# Patient Record
Sex: FEMALE | Race: WHITE | NOT HISPANIC OR LATINO | Employment: STUDENT | URBAN - METROPOLITAN AREA
[De-identification: names, ages, dates, MRNs, and addresses within clinical notes are randomized per-mention and may not be internally consistent; named-entity substitution may affect disease eponyms.]

---

## 2017-05-09 RX ORDER — DEXTROAMPHETAMINE SACCHARATE, AMPHETAMINE ASPARTATE MONOHYDRATE, DEXTROAMPHETAMINE SULFATE AND AMPHETAMINE SULFATE 7.5; 7.5; 7.5; 7.5 MG/1; MG/1; MG/1; MG/1
30 CAPSULE, EXTENDED RELEASE ORAL EVERY MORNING
COMMUNITY
End: 2017-06-23 | Stop reason: SDUPTHER

## 2017-06-23 ENCOUNTER — OFFICE VISIT (OUTPATIENT)
Dept: INTERNAL MEDICINE | Facility: CLINIC | Age: 27
End: 2017-06-23
Payer: COMMERCIAL

## 2017-06-23 ENCOUNTER — TELEPHONE (OUTPATIENT)
Dept: INTERNAL MEDICINE | Facility: CLINIC | Age: 27
End: 2017-06-23

## 2017-06-23 VITALS
RESPIRATION RATE: 16 BRPM | SYSTOLIC BLOOD PRESSURE: 92 MMHG | BODY MASS INDEX: 27.46 KG/M2 | HEART RATE: 77 BPM | WEIGHT: 170.88 LBS | DIASTOLIC BLOOD PRESSURE: 72 MMHG | HEIGHT: 66 IN | TEMPERATURE: 99 F | OXYGEN SATURATION: 98 %

## 2017-06-23 DIAGNOSIS — Z12.4 CERVICAL CANCER SCREENING: ICD-10-CM

## 2017-06-23 DIAGNOSIS — Z23 NEED FOR DIPHTHERIA-TETANUS-PERTUSSIS (TDAP) VACCINE: ICD-10-CM

## 2017-06-23 DIAGNOSIS — F90.0 ATTENTION DEFICIT HYPERACTIVITY DISORDER (ADHD), PREDOMINANTLY INATTENTIVE TYPE: Primary | ICD-10-CM

## 2017-06-23 DIAGNOSIS — Z79.899 ENCOUNTER FOR LONG-TERM (CURRENT) USE OF OTHER MEDICATIONS: ICD-10-CM

## 2017-06-23 PROCEDURE — 99213 OFFICE O/P EST LOW 20 MIN: CPT | Mod: S$GLB,,, | Performed by: FAMILY MEDICINE

## 2017-06-23 PROCEDURE — 99999 PR PBB SHADOW E&M-EST. PATIENT-LVL IV: CPT | Mod: PBBFAC,,, | Performed by: FAMILY MEDICINE

## 2017-06-23 RX ORDER — DEXTROAMPHETAMINE SACCHARATE, AMPHETAMINE ASPARTATE MONOHYDRATE, DEXTROAMPHETAMINE SULFATE AND AMPHETAMINE SULFATE 7.5; 7.5; 7.5; 7.5 MG/1; MG/1; MG/1; MG/1
30 CAPSULE, EXTENDED RELEASE ORAL EVERY MORNING
Qty: 30 CAPSULE | Refills: 0 | Status: SHIPPED | OUTPATIENT
Start: 2017-06-23 | End: 2017-09-15 | Stop reason: SDUPTHER

## 2017-06-23 RX ORDER — DEXTROAMPHETAMINE SACCHARATE, AMPHETAMINE ASPARTATE MONOHYDRATE, DEXTROAMPHETAMINE SULFATE AND AMPHETAMINE SULFATE 7.5; 7.5; 7.5; 7.5 MG/1; MG/1; MG/1; MG/1
30 CAPSULE, EXTENDED RELEASE ORAL EVERY MORNING
Qty: 30 CAPSULE | Refills: 0 | Status: SHIPPED | OUTPATIENT
Start: 2017-07-23 | End: 2017-09-15 | Stop reason: SDUPTHER

## 2017-06-23 RX ORDER — DEXTROAMPHETAMINE SACCHARATE, AMPHETAMINE ASPARTATE MONOHYDRATE, DEXTROAMPHETAMINE SULFATE AND AMPHETAMINE SULFATE 7.5; 7.5; 7.5; 7.5 MG/1; MG/1; MG/1; MG/1
30 CAPSULE, EXTENDED RELEASE ORAL EVERY MORNING
Qty: 30 CAPSULE | Refills: 0 | Status: SHIPPED | OUTPATIENT
Start: 2017-08-23 | End: 2017-09-15 | Stop reason: SDUPTHER

## 2017-06-23 NOTE — PROGRESS NOTES
NOTE: Documentation entered by me for this encounter was done in part using speech-recognition technology. Although I have made an effort to ensure accuracy, malapropisms may exist and should be interpreted in context.  -DEVIN Estrada MD    PATIENT ID: Alfie Carroll is a 27 y.o. female.    CHIEF COMPLAINT  ADHD; Medication Refill; and Follow-up      HISTORY OF PRESENT ILLNESS  NOTE: The following condition(s) were addressed during this encounter. The listed order is one of convenience and does not necessarily reflect the chronology of the appointment, nor the relative importance of a condition. It is possible that additional description or status details about condition(s) may be found elsewhere in the documentation for today's encounter.    Problem List Items Addressed This Visit     Attention deficit hyperactivity disorder (ADHD), predominantly inattentive type - Primary    Current Assessment & Plan     Based on the report of the patient and information available to me at present, this condition appears to be WELL CONTROLLED, and this condition appears to be STABLE. She describes the QUALITY of her symptoms as primarily those of the inattention category. She reports her perception of the SEVERITY of her inattention symptoms as MODERATELY when she doesn't take her medication. ONSET of her symptoms was in childhood, consistent with ADD/ADHD. She clearly identifies that her stimulant medication helps mitigate her symptoms (ALLEVIATING FACTOR) and she observes improved performance in relevant contexts (i.e. at work) when taking her medication. She reports no mood instability or other potential medication side-effects. She wants to continue her current treatment plan. She has demonstrated no behaviors to suggest inappropriate use of her medication.           Relevant Medications    dextroamphetamine-amphetamine (ADDERALL XR) 30 MG 24 hr capsule    dextroamphetamine-amphetamine (ADDERALL XR) 30 MG 24 hr capsule  (Start on 7/23/2017)    dextroamphetamine-amphetamine (ADDERALL XR) 30 MG 24 hr capsule (Start on 8/23/2017)    Encounter for long-term (current) use of other medications    Current Assessment & Plan     She appears to be tolerating her current medications well and reports preceiving no adverse side-effects.         Relevant Medications    dextroamphetamine-amphetamine (ADDERALL XR) 30 MG 24 hr capsule    dextroamphetamine-amphetamine (ADDERALL XR) 30 MG 24 hr capsule (Start on 7/23/2017)    dextroamphetamine-amphetamine (ADDERALL XR) 30 MG 24 hr capsule (Start on 8/23/2017)    Need for diphtheria-tetanus-pertussis (Tdap) vaccine    Relevant Medications    DIPH,PERTUSS,ACEL,,TET VAC,PF, ADULT (ADACEL) 2 Lf-(2.5-5-3-5 mcg)-5Lf/0.5 mL Syrg    Cervical cancer screening    Relevant Orders    Ambulatory referral to Gynecology      Other Visit Diagnoses    None.       REVIEW OF SYSTEMS  PSYCHIATRIC: No suicidal ideations, mima or hypomania reported.  NEUROLOGIC: No seizures or disordered movement reported.  ENDOCRINE: No polyuria or polydipsia reported.     PHYSICAL EXAM  CONSTITUTIONAL: Vital signs (BP, P, T, RR, et al) noted. No apparent distress. Does not appear acutely ill or septic. Appears adequately hydrated.  HEENT: External ENT grossly unremarkable. Hearing grossly intact. Oropharynx moist.  PULM: Breathing unlabored.  DERM: Skin warm and moist with normal turgor.  NEURO: There are no gross focal motor deficits or gross deficits of cranial nerves III-XII.  PSYCHIATRIC: Alert and oriented x 3. Mood is grossly neutral. Affect appropriate. Judgment and insight not grossly compromised.     HEALTH MAINTENANCE - DUE SOON OR OVERDUE  Health Maintenance Due   Topic Date Due    Pap Smear  02/23/2011    TETANUS VACCINE  08/10/2016        HEALTH MAINTENANCE - COMPLETED  Health Maintenance Topics with due status: Not Due       Topic Last Completion Date    Influenza Vaccine 10/06/2014        CURRENT MEDICATION  "LIST REVIEWED AND UPDATED  Current Outpatient Prescriptions   Medication Sig    dextroamphetamine-amphetamine (ADDERALL XR) 30 MG 24 hr capsule Take 1 capsule (30 mg total) by mouth every morning.    [START ON 7/23/2017] dextroamphetamine-amphetamine (ADDERALL XR) 30 MG 24 hr capsule Take 1 capsule (30 mg total) by mouth every morning.    [START ON 8/23/2017] dextroamphetamine-amphetamine (ADDERALL XR) 30 MG 24 hr capsule Take 1 capsule (30 mg total) by mouth every morning.    DIPH,PERTUSS,ACEL,,TET VAC,PF, ADULT (ADACEL) 2 Lf-(2.5-5-3-5 mcg)-5Lf/0.5 mL Syrg Inject 0.5 mLs into the muscle once. (Pharmacist administer according to protocol.)     No current facility-administered medications for this visit.        ALLERGY LIST REVIEWED AND UPDATED  Review of patient's allergies indicates:  No Known Allergies    MEDICAL HISTORY REVIEWED AND UPDATED  She has a past medical history of Attention deficit hyperactivity disorder (ADHD), predominantly inattentive type.    SURGICAL HISTORY REVIEWED AND UPDATED  She has a past surgical history that includes Vaginal delivery.    SOCIAL HISTORY REVIEWED AND UPDATED  She reports that she has never smoked. She has never used smokeless tobacco. She reports that she does not drink alcohol or use drugs.    ASSESSMENT and PLAN  NOTE: Unless specified otherwise, the PLAN for a listed ASSESSMENT is to continue present treatment as prescribed. The planned follow-up and additional "Patient Instructions" may also be found in the After Visit Summary printed and provided to the patient.    Attention deficit hyperactivity disorder (ADHD), predominantly inattentive type  -     dextroamphetamine-amphetamine (ADDERALL XR) 30 MG 24 hr capsule; Take 1 capsule (30 mg total) by mouth every morning.  Dispense: 30 capsule; Refill: 0  -     dextroamphetamine-amphetamine (ADDERALL XR) 30 MG 24 hr capsule; Take 1 capsule (30 mg total) by mouth every morning.  Dispense: 30 capsule; Refill: 0  -     " dextroamphetamine-amphetamine (ADDERALL XR) 30 MG 24 hr capsule; Take 1 capsule (30 mg total) by mouth every morning.  Dispense: 30 capsule; Refill: 0    Encounter for long-term (current) use of other medications  -     dextroamphetamine-amphetamine (ADDERALL XR) 30 MG 24 hr capsule; Take 1 capsule (30 mg total) by mouth every morning.  Dispense: 30 capsule; Refill: 0  -     dextroamphetamine-amphetamine (ADDERALL XR) 30 MG 24 hr capsule; Take 1 capsule (30 mg total) by mouth every morning.  Dispense: 30 capsule; Refill: 0  -     dextroamphetamine-amphetamine (ADDERALL XR) 30 MG 24 hr capsule; Take 1 capsule (30 mg total) by mouth every morning.  Dispense: 30 capsule; Refill: 0    Need for diphtheria-tetanus-pertussis (Tdap) vaccine  -     DIPH,PERTUSS,ACEL,,TET VAC,PF, ADULT (ADACEL) 2 Lf-(2.5-5-3-5 mcg)-5Lf/0.5 mL Syrg; Inject 0.5 mLs into the muscle once. (Pharmacist administer according to protocol.)  Dispense: 0.5 mL; Refill: 0    Cervical cancer screening  -     Ambulatory referral to Gynecology        Medication List with Changes/Refills   New Medications    DEXTROAMPHETAMINE-AMPHETAMINE (ADDERALL XR) 30 MG 24 HR CAPSULE    Take 1 capsule (30 mg total) by mouth every morning.    DEXTROAMPHETAMINE-AMPHETAMINE (ADDERALL XR) 30 MG 24 HR CAPSULE    Take 1 capsule (30 mg total) by mouth every morning.    DIPH,PERTUSS,ACEL,,TET VAC,PF, ADULT (ADACEL) 2 LF-(2.5-5-3-5 MCG)-5LF/0.5 ML SYRG    Inject 0.5 mLs into the muscle once. (Pharmacist administer according to protocol.)   Changed and/or Refilled Medications    Modified Medication Previous Medication    DEXTROAMPHETAMINE-AMPHETAMINE (ADDERALL XR) 30 MG 24 HR CAPSULE dextroamphetamine-amphetamine (ADDERALL XR) 30 MG 24 hr capsule       Take 1 capsule (30 mg total) by mouth every morning.    Take 30 mg by mouth every morning.       Return in about 3 months (around 9/23/2017) for re-evaluate chronic conditions.

## 2017-06-23 NOTE — ASSESSMENT & PLAN NOTE
Based on the report of the patient and information available to me at present, this condition appears to be WELL CONTROLLED, and this condition appears to be STABLE. She describes the QUALITY of her symptoms as primarily those of the inattention category. She reports her perception of the SEVERITY of her inattention symptoms as MODERATELY when she doesn't take her medication. ONSET of her symptoms was in childhood, consistent with ADD/ADHD. She clearly identifies that her stimulant medication helps mitigate her symptoms (ALLEVIATING FACTOR) and she observes improved performance in relevant contexts (i.e. at work) when taking her medication. She reports no mood instability or other potential medication side-effects. She wants to continue her current treatment plan. She has demonstrated no behaviors to suggest inappropriate use of her medication.

## 2017-06-23 NOTE — TELEPHONE ENCOUNTER
Faxed OB/GYN referral to pt current OB Dr. Jos Mae @ [facility], fax transmission confirmed [phone].

## 2017-09-15 ENCOUNTER — OFFICE VISIT (OUTPATIENT)
Dept: INTERNAL MEDICINE | Facility: CLINIC | Age: 27
End: 2017-09-15
Payer: COMMERCIAL

## 2017-09-15 VITALS
WEIGHT: 168.88 LBS | OXYGEN SATURATION: 98 % | TEMPERATURE: 97 F | SYSTOLIC BLOOD PRESSURE: 98 MMHG | HEART RATE: 77 BPM | HEIGHT: 66 IN | DIASTOLIC BLOOD PRESSURE: 62 MMHG | BODY MASS INDEX: 27.14 KG/M2

## 2017-09-15 DIAGNOSIS — Z23 NEED FOR DIPHTHERIA-TETANUS-PERTUSSIS (TDAP) VACCINE: ICD-10-CM

## 2017-09-15 DIAGNOSIS — Z79.899 ENCOUNTER FOR LONG-TERM (CURRENT) USE OF OTHER MEDICATIONS: ICD-10-CM

## 2017-09-15 DIAGNOSIS — F90.0 ATTENTION DEFICIT HYPERACTIVITY DISORDER (ADHD), PREDOMINANTLY INATTENTIVE TYPE: Primary | ICD-10-CM

## 2017-09-15 PROCEDURE — 99213 OFFICE O/P EST LOW 20 MIN: CPT | Mod: S$GLB,,, | Performed by: FAMILY MEDICINE

## 2017-09-15 PROCEDURE — 99999 PR PBB SHADOW E&M-EST. PATIENT-LVL III: CPT | Mod: PBBFAC,,, | Performed by: FAMILY MEDICINE

## 2017-09-15 PROCEDURE — 90471 IMMUNIZATION ADMIN: CPT | Mod: S$GLB,,, | Performed by: FAMILY MEDICINE

## 2017-09-15 PROCEDURE — 90715 TDAP VACCINE 7 YRS/> IM: CPT | Mod: S$GLB,,, | Performed by: FAMILY MEDICINE

## 2017-09-15 RX ORDER — DEXTROAMPHETAMINE SACCHARATE, AMPHETAMINE ASPARTATE MONOHYDRATE, DEXTROAMPHETAMINE SULFATE AND AMPHETAMINE SULFATE 7.5; 7.5; 7.5; 7.5 MG/1; MG/1; MG/1; MG/1
30 CAPSULE, EXTENDED RELEASE ORAL EVERY MORNING
Qty: 30 CAPSULE | Refills: 0 | Status: SHIPPED | OUTPATIENT
Start: 2017-09-15 | End: 2017-12-19 | Stop reason: SDUPTHER

## 2017-09-15 RX ORDER — DEXTROAMPHETAMINE SACCHARATE, AMPHETAMINE ASPARTATE MONOHYDRATE, DEXTROAMPHETAMINE SULFATE AND AMPHETAMINE SULFATE 7.5; 7.5; 7.5; 7.5 MG/1; MG/1; MG/1; MG/1
30 CAPSULE, EXTENDED RELEASE ORAL EVERY MORNING
Qty: 30 CAPSULE | Refills: 0 | Status: SHIPPED | OUTPATIENT
Start: 2017-11-15 | End: 2017-12-19 | Stop reason: SDUPTHER

## 2017-09-15 RX ORDER — DEXTROAMPHETAMINE SACCHARATE, AMPHETAMINE ASPARTATE MONOHYDRATE, DEXTROAMPHETAMINE SULFATE AND AMPHETAMINE SULFATE 7.5; 7.5; 7.5; 7.5 MG/1; MG/1; MG/1; MG/1
30 CAPSULE, EXTENDED RELEASE ORAL EVERY MORNING
Qty: 30 CAPSULE | Refills: 0 | Status: SHIPPED | OUTPATIENT
Start: 2017-10-15 | End: 2017-12-19 | Stop reason: SDUPTHER

## 2017-09-15 NOTE — PROGRESS NOTES
HEALTH MAINTENANCE REVIEW  Health Maintenance   Topic Date Due    Pap Smear  02/23/2011    Influenza Vaccine  08/01/2017    TETANUS VACCINE  09/15/2027    Lipid Panel  Completed        HEALTH MAINTENANCE INTERVENTIONS - DUE OR DUE SOON  Health Maintenance Due   Topic Date Due    Pap Smear  02/23/2011    Influenza Vaccine  08/01/2017       FUTURE APPOINTMENTS  No future appointments.    CHIEF COMPLAINT  ADHD      HISTORY OF PRESENT ILLNESS  PROBLEM/CONDITION: Attention deficit disorder appears fairly well-controlled on current treatment. No mood instability reported. No behaviors to suggest inappropriate use of medication.     PROBLEM/CONDITION: She has not had tetanus booster in over 10 years.     PROBLEM/CONDITION: She is due for Pap smear but does not want to do this today. She said she would schedule an appointment soon for Pap smear.    No other complaints or concerns reported.    Problem List Items Addressed This Visit     Attention deficit hyperactivity disorder (ADHD), predominantly inattentive type - Primary    Relevant Medications    dextroamphetamine-amphetamine (ADDERALL XR) 30 MG 24 hr capsule    dextroamphetamine-amphetamine (ADDERALL XR) 30 MG 24 hr capsule    dextroamphetamine-amphetamine (ADDERALL XR) 30 MG 24 hr capsule (Start on 11/15/2017)    Encounter for long-term (current) use of other medications    Relevant Medications    dextroamphetamine-amphetamine (ADDERALL XR) 30 MG 24 hr capsule    dextroamphetamine-amphetamine (ADDERALL XR) 30 MG 24 hr capsule    dextroamphetamine-amphetamine (ADDERALL XR) 30 MG 24 hr capsule (Start on 11/15/2017)    Need for diphtheria-tetanus-pertussis (Tdap) vaccine    Relevant Orders    (In Office Administered) Tdap Vaccine (Completed)          REVIEW OF SYSTEMS  PSYCHIATRIC: No suicidal ideations, mima or hypomania reported.  NEUROLOGIC: No seizures or disordered movement reported.  ENDOCRINE: No polyuria or polydipsia reported.     PHYSICAL  "EXAM  Vitals:    09/15/17 1400   BP: 98/62   BP Location: Right arm   Patient Position: Sitting   BP Method: Medium (Manual)   Pulse: 77   Temp: 97 °F (36.1 °C)   TempSrc: Tympanic   SpO2: 98%   Weight: 76.6 kg (168 lb 14 oz)   Height: 5' 5.5" (1.664 m)   CONSTITUTIONAL: Vital signs noted. No apparent distress. Does not appear acutely ill or septic. Appears adequately hydrated.  PULM: Lungs clear. Breathing unlabored.  CV: Heart regular.  GI: Abdomen soft and nontender.  DERM: Warm and moist.  PSYCHIATRIC: Alert and oriented x 3. Mood is grossly neutral. Affect appropriate. Judgment and insight not grossly compromised.     PAST MEDICAL HISTORY, FAMILY HISTORY, SOCIAL HISTORY, CURRENT MEDICATION LIST, and ALLERGY LIST reviewed by me (DEVIN Estrada MD) and are updated consistent with the patient's report.    ASSESSMENT and PLAN  Attention deficit hyperactivity disorder (ADHD), predominantly inattentive type  -     dextroamphetamine-amphetamine (ADDERALL XR) 30 MG 24 hr capsule; Take 1 capsule (30 mg total) by mouth every morning.  Dispense: 30 capsule; Refill: 0  -     dextroamphetamine-amphetamine (ADDERALL XR) 30 MG 24 hr capsule; Take 1 capsule (30 mg total) by mouth every morning.  Dispense: 30 capsule; Refill: 0  -     dextroamphetamine-amphetamine (ADDERALL XR) 30 MG 24 hr capsule; Take 1 capsule (30 mg total) by mouth every morning.  Dispense: 30 capsule; Refill: 0    Encounter for long-term (current) use of other medications  -     dextroamphetamine-amphetamine (ADDERALL XR) 30 MG 24 hr capsule; Take 1 capsule (30 mg total) by mouth every morning.  Dispense: 30 capsule; Refill: 0  -     dextroamphetamine-amphetamine (ADDERALL XR) 30 MG 24 hr capsule; Take 1 capsule (30 mg total) by mouth every morning.  Dispense: 30 capsule; Refill: 0  -     dextroamphetamine-amphetamine (ADDERALL XR) 30 MG 24 hr capsule; Take 1 capsule (30 mg total) by mouth every morning.  Dispense: 30 capsule; Refill: 0    Need " "for diphtheria-tetanus-pertussis (Tdap) vaccine  -     (In Office Administered) Tdap Vaccine        Medication List with Changes/Refills   Changed and/or Refilled Medications    Modified Medication Previous Medication    DEXTROAMPHETAMINE-AMPHETAMINE (ADDERALL XR) 30 MG 24 HR CAPSULE dextroamphetamine-amphetamine (ADDERALL XR) 30 MG 24 hr capsule       Take 1 capsule (30 mg total) by mouth every morning.    Take 1 capsule (30 mg total) by mouth every morning.    DEXTROAMPHETAMINE-AMPHETAMINE (ADDERALL XR) 30 MG 24 HR CAPSULE dextroamphetamine-amphetamine (ADDERALL XR) 30 MG 24 hr capsule       Take 1 capsule (30 mg total) by mouth every morning.    Take 1 capsule (30 mg total) by mouth every morning.    DEXTROAMPHETAMINE-AMPHETAMINE (ADDERALL XR) 30 MG 24 HR CAPSULE dextroamphetamine-amphetamine (ADDERALL XR) 30 MG 24 hr capsule       Take 1 capsule (30 mg total) by mouth every morning.    Take 1 capsule (30 mg total) by mouth every morning.       Return in about 3 months (around 12/19/2017) for re-evaluate problem(s) discussed today.    ABOUT THIS DOCUMENTATION:  · The order of the conditions listed in the HPI is one of convenience and does not necessarily reflect the chronology of the appointment, nor the relative importance of a condition. It is possible that additional description or status details about condition(s) may be found elsewhere in the documentation for today's encounter.  · Documentation entered by me for this encounter was done in part using speech-recognition technology. Although I have made an effort to ensure accuracy, "sound like" errors may exist and should be interpreted in context.                        -DEVIN Estrada MD    There are no Patient Instructions on file for this visit.     Answers for HPI/ROS submitted by the patient on 9/15/2017   activity change: No  unexpected weight change: No  neck pain: No  hearing loss: No  rhinorrhea: No  trouble swallowing: No  eye discharge: " No  visual disturbance: No  chest tightness: No  wheezing: No  chest pain: No  palpitations: No  blood in stool: No  constipation: No  vomiting: No  diarrhea: No  polydipsia: No  polyuria: No  difficulty urinating: No  hematuria: No  menstrual problem: No  dysuria: No  joint swelling: No  arthralgias: No  headaches: No  weakness: No  confusion: No  dysphoric mood: No

## 2017-12-19 ENCOUNTER — OFFICE VISIT (OUTPATIENT)
Dept: INTERNAL MEDICINE | Facility: CLINIC | Age: 27
End: 2017-12-19
Payer: COMMERCIAL

## 2017-12-19 VITALS
WEIGHT: 171.06 LBS | OXYGEN SATURATION: 100 % | SYSTOLIC BLOOD PRESSURE: 112 MMHG | HEART RATE: 95 BPM | HEIGHT: 66 IN | DIASTOLIC BLOOD PRESSURE: 72 MMHG | BODY MASS INDEX: 27.49 KG/M2 | TEMPERATURE: 96 F

## 2017-12-19 DIAGNOSIS — F90.0 ATTENTION-DEFICIT HYPERACTIVITY DISORDER, PREDOMINANTLY INATTENTIVE TYPE: ICD-10-CM

## 2017-12-19 DIAGNOSIS — F90.0 ATTENTION DEFICIT HYPERACTIVITY DISORDER (ADHD), PREDOMINANTLY INATTENTIVE TYPE: Primary | ICD-10-CM

## 2017-12-19 DIAGNOSIS — Z23 NEED FOR INFLUENZA VACCINATION: ICD-10-CM

## 2017-12-19 PROCEDURE — 90471 IMMUNIZATION ADMIN: CPT | Mod: S$GLB,,, | Performed by: FAMILY MEDICINE

## 2017-12-19 PROCEDURE — 90686 IIV4 VACC NO PRSV 0.5 ML IM: CPT | Mod: S$GLB,,, | Performed by: FAMILY MEDICINE

## 2017-12-19 PROCEDURE — 99999 PR PBB SHADOW E&M-EST. PATIENT-LVL III: CPT | Mod: PBBFAC,,, | Performed by: FAMILY MEDICINE

## 2017-12-19 PROCEDURE — 99213 OFFICE O/P EST LOW 20 MIN: CPT | Mod: 25,S$GLB,, | Performed by: FAMILY MEDICINE

## 2017-12-19 RX ORDER — DEXTROAMPHETAMINE SACCHARATE, AMPHETAMINE ASPARTATE MONOHYDRATE, DEXTROAMPHETAMINE SULFATE AND AMPHETAMINE SULFATE 7.5; 7.5; 7.5; 7.5 MG/1; MG/1; MG/1; MG/1
30 CAPSULE, EXTENDED RELEASE ORAL EVERY MORNING
Qty: 30 CAPSULE | Refills: 0 | Status: SHIPPED | OUTPATIENT
Start: 2017-12-19 | End: 2018-03-15 | Stop reason: SDUPTHER

## 2017-12-19 RX ORDER — DEXTROAMPHETAMINE SACCHARATE, AMPHETAMINE ASPARTATE MONOHYDRATE, DEXTROAMPHETAMINE SULFATE AND AMPHETAMINE SULFATE 7.5; 7.5; 7.5; 7.5 MG/1; MG/1; MG/1; MG/1
30 CAPSULE, EXTENDED RELEASE ORAL EVERY MORNING
Qty: 30 CAPSULE | Refills: 0 | Status: SHIPPED | OUTPATIENT
Start: 2018-01-19 | End: 2018-03-15 | Stop reason: SDUPTHER

## 2017-12-19 RX ORDER — DEXTROAMPHETAMINE SACCHARATE, AMPHETAMINE ASPARTATE MONOHYDRATE, DEXTROAMPHETAMINE SULFATE AND AMPHETAMINE SULFATE 7.5; 7.5; 7.5; 7.5 MG/1; MG/1; MG/1; MG/1
30 CAPSULE, EXTENDED RELEASE ORAL EVERY MORNING
Qty: 30 CAPSULE | Refills: 0 | Status: SHIPPED | OUTPATIENT
Start: 2018-02-19 | End: 2018-03-15 | Stop reason: SDUPTHER

## 2017-12-19 NOTE — PROGRESS NOTES
HEALTH MAINTENANCE REVIEW  Health Maintenance   Topic Date Due    Pap Smear  02/23/2011    Influenza Vaccine  08/01/2017    TETANUS VACCINE  09/15/2027    Lipid Panel  Completed        HEALTH MAINTENANCE INTERVENTIONS - DUE OR DUE SOON  Health Maintenance Due   Topic Date Due    Pap Smear  02/23/2011    Influenza Vaccine  08/01/2017       FUTURE APPOINTMENTS  No future appointments.    CHIEF COMPLAINT  Medication Refill      HISTORY OF PRESENT ILLNESS  PROBLEM/CONDITION: She comes in for re-evaluation of ADD/ADHD treatment. She clearly identifies that her medication helps mitigate her symptoms, and She observes improved performance in relevant circumstances when taking her medication and impaired performance when she misses her medication. She has demonstrated no behaviors to suggest inappropriate use of her medication. [facility] Controlled Prescription Drug Monitoring database was queried and showed no activity to suggest abuse, diversion, or other inappropriate use of prescription medications. No other complaints or concerns reported.      Problem List Items Addressed This Visit     Attention deficit hyperactivity disorder (ADHD), predominantly inattentive type - Primary    Relevant Medications    dextroamphetamine-amphetamine (ADDERALL XR) 30 MG 24 hr capsule    dextroamphetamine-amphetamine (ADDERALL XR) 30 MG 24 hr capsule (Start on 1/19/2018)    dextroamphetamine-amphetamine (ADDERALL XR) 30 MG 24 hr capsule (Start on 2/19/2018)      Other Visit Diagnoses     Attention-deficit hyperactivity disorder, predominantly inattentive type        Relevant Medications    dextroamphetamine-amphetamine (ADDERALL XR) 30 MG 24 hr capsule    dextroamphetamine-amphetamine (ADDERALL XR) 30 MG 24 hr capsule (Start on 1/19/2018)    dextroamphetamine-amphetamine (ADDERALL XR) 30 MG 24 hr capsule (Start on 2/19/2018)    Need for influenza vaccination              REVIEW OF SYSTEMS  PSYCHIATRIC: No mood  "instability reported.  NEUROLOGIC: No seizures or tics reported.     PHYSICAL EXAM  Vitals:    12/19/17 1034   BP: 112/72   BP Location: Right arm   Patient Position: Sitting   BP Method: Medium (Manual)   Pulse: 95   Temp: 96.4 °F (35.8 °C)   TempSrc: Tympanic   SpO2: 100%   Weight: 77.6 kg (171 lb 1.2 oz)   Height: 5' 5.5" (1.664 m)     CONST: Vital signs noted. No apparent distress. Appears well.  PULM: Breathing unlabored.  CV: Heart regular.  DERM: Warm and moist. Normal turgor.  PSYCH: Alert and oriented x 3. Mood is grossly euthymic. Affect appropriate. Judgment and insight not grossly compromised.     PAST MEDICAL HISTORY, FAMILY HISTORY, SOCIAL HISTORY, CURRENT MEDICATION LIST, and ALLERGY LIST reviewed by me (DEVIN Estrada MD) and are updated consistent with the patient's report.    ASSESSMENT and PLAN  Attention deficit hyperactivity disorder (ADHD), predominantly inattentive type  -     dextroamphetamine-amphetamine (ADDERALL XR) 30 MG 24 hr capsule; Take 1 capsule (30 mg total) by mouth every morning.  Dispense: 30 capsule; Refill: 0  -     dextroamphetamine-amphetamine (ADDERALL XR) 30 MG 24 hr capsule; Take 1 capsule (30 mg total) by mouth every morning.  Dispense: 30 capsule; Refill: 0  -     dextroamphetamine-amphetamine (ADDERALL XR) 30 MG 24 hr capsule; Take 1 capsule (30 mg total) by mouth every morning.  Dispense: 30 capsule; Refill: 0    Attention-deficit hyperactivity disorder, predominantly inattentive type  -     dextroamphetamine-amphetamine (ADDERALL XR) 30 MG 24 hr capsule; Take 1 capsule (30 mg total) by mouth every morning.  Dispense: 30 capsule; Refill: 0  -     dextroamphetamine-amphetamine (ADDERALL XR) 30 MG 24 hr capsule; Take 1 capsule (30 mg total) by mouth every morning.  Dispense: 30 capsule; Refill: 0  -     dextroamphetamine-amphetamine (ADDERALL XR) 30 MG 24 hr capsule; Take 1 capsule (30 mg total) by mouth every morning.  Dispense: 30 capsule; Refill: 0    Need " "for influenza vaccination    Other orders  -     Influenza - Quadrivalent (3 years & older) (PF)        PRESCRIPTION MEDICATION MANAGEMENT  Medication List with Changes/Refills   Changed and/or Refilled Medications    Modified Medication Previous Medication    DEXTROAMPHETAMINE-AMPHETAMINE (ADDERALL XR) 30 MG 24 HR CAPSULE dextroamphetamine-amphetamine (ADDERALL XR) 30 MG 24 hr capsule       Take 1 capsule (30 mg total) by mouth every morning.    Take 1 capsule (30 mg total) by mouth every morning.    DEXTROAMPHETAMINE-AMPHETAMINE (ADDERALL XR) 30 MG 24 HR CAPSULE dextroamphetamine-amphetamine (ADDERALL XR) 30 MG 24 hr capsule       Take 1 capsule (30 mg total) by mouth every morning.    Take 1 capsule (30 mg total) by mouth every morning.    DEXTROAMPHETAMINE-AMPHETAMINE (ADDERALL XR) 30 MG 24 HR CAPSULE dextroamphetamine-amphetamine (ADDERALL XR) 30 MG 24 hr capsule       Take 1 capsule (30 mg total) by mouth every morning.    Take 1 capsule (30 mg total) by mouth every morning.       Return in about 3 months (around 3/19/2018) for re-evaluate problem(s) discussed today.    ABOUT THIS DOCUMENTATION:  · The order of the conditions listed in the HPI is one of convenience and does not necessarily reflect the chronology of the appointment, nor the relative importance of a condition. It is possible that additional description or status details about condition(s) may be found elsewhere in the EHR documentation for today's encounter.  · Documentation entered by me for this encounter was done in part using speech-recognition technology. Although I have made an effort to ensure accuracy, "sound like" errors may exist and should be interpreted in context.                        -DEVIN Estrada MD    Patient Instructions           Answers for HPI/ROS submitted by the patient on 12/19/2017   activity change: No  unexpected weight change: No  neck pain: No  hearing loss: No  rhinorrhea: No  trouble swallowing: No  eye " discharge: No  visual disturbance: No  chest tightness: No  wheezing: No  chest pain: No  palpitations: No  blood in stool: No  constipation: No  vomiting: No  diarrhea: No  polydipsia: No  polyuria: No  difficulty urinating: No  hematuria: No  menstrual problem: No  dysuria: No  joint swelling: No  arthralgias: No  headaches: No  weakness: No  confusion: No  dysphoric mood: No

## 2018-03-15 ENCOUNTER — OFFICE VISIT (OUTPATIENT)
Dept: INTERNAL MEDICINE | Facility: CLINIC | Age: 28
End: 2018-03-15
Payer: MEDICAID

## 2018-03-15 VITALS
HEIGHT: 66 IN | WEIGHT: 172.81 LBS | BODY MASS INDEX: 27.77 KG/M2 | DIASTOLIC BLOOD PRESSURE: 72 MMHG | OXYGEN SATURATION: 99 % | HEART RATE: 96 BPM | SYSTOLIC BLOOD PRESSURE: 104 MMHG | TEMPERATURE: 97 F

## 2018-03-15 DIAGNOSIS — F90.0 ATTENTION-DEFICIT HYPERACTIVITY DISORDER, PREDOMINANTLY INATTENTIVE TYPE: ICD-10-CM

## 2018-03-15 DIAGNOSIS — F90.0 ATTENTION DEFICIT HYPERACTIVITY DISORDER (ADHD), PREDOMINANTLY INATTENTIVE TYPE: Primary | ICD-10-CM

## 2018-03-15 PROBLEM — Z12.4 CERVICAL CANCER SCREENING: Status: RESOLVED | Noted: 2017-06-23 | Resolved: 2018-03-15

## 2018-03-15 PROBLEM — Z23 NEED FOR DIPHTHERIA-TETANUS-PERTUSSIS (TDAP) VACCINE: Status: RESOLVED | Noted: 2017-06-23 | Resolved: 2018-03-15

## 2018-03-15 PROCEDURE — 99213 OFFICE O/P EST LOW 20 MIN: CPT | Mod: PBBFAC,PO | Performed by: FAMILY MEDICINE

## 2018-03-15 PROCEDURE — 99213 OFFICE O/P EST LOW 20 MIN: CPT | Mod: S$PBB,,, | Performed by: FAMILY MEDICINE

## 2018-03-15 PROCEDURE — 99999 PR PBB SHADOW E&M-EST. PATIENT-LVL III: CPT | Mod: PBBFAC,,, | Performed by: FAMILY MEDICINE

## 2018-03-15 RX ORDER — DEXTROAMPHETAMINE SACCHARATE, AMPHETAMINE ASPARTATE MONOHYDRATE, DEXTROAMPHETAMINE SULFATE AND AMPHETAMINE SULFATE 7.5; 7.5; 7.5; 7.5 MG/1; MG/1; MG/1; MG/1
30 CAPSULE, EXTENDED RELEASE ORAL EVERY MORNING
Qty: 30 CAPSULE | Refills: 0 | Status: SHIPPED | OUTPATIENT
Start: 2018-04-15 | End: 2018-06-15 | Stop reason: SDUPTHER

## 2018-03-15 RX ORDER — DEXTROAMPHETAMINE SACCHARATE, AMPHETAMINE ASPARTATE MONOHYDRATE, DEXTROAMPHETAMINE SULFATE AND AMPHETAMINE SULFATE 7.5; 7.5; 7.5; 7.5 MG/1; MG/1; MG/1; MG/1
30 CAPSULE, EXTENDED RELEASE ORAL EVERY MORNING
Qty: 30 CAPSULE | Refills: 0 | Status: SHIPPED | OUTPATIENT
Start: 2018-05-15 | End: 2018-06-15 | Stop reason: SDUPTHER

## 2018-03-15 RX ORDER — DEXTROAMPHETAMINE SACCHARATE, AMPHETAMINE ASPARTATE MONOHYDRATE, DEXTROAMPHETAMINE SULFATE AND AMPHETAMINE SULFATE 7.5; 7.5; 7.5; 7.5 MG/1; MG/1; MG/1; MG/1
30 CAPSULE, EXTENDED RELEASE ORAL EVERY MORNING
Qty: 30 CAPSULE | Refills: 0 | Status: SHIPPED | OUTPATIENT
Start: 2018-03-15 | End: 2018-06-15 | Stop reason: SDUPTHER

## 2018-03-15 NOTE — PROGRESS NOTES
"FUTURE APPOINTMENTS  No future appointments.    CHIEF COMPLAINT  Medication Refill      HISTORY OF PRESENT ILLNESS  She reports that her current treatment is providing satisfactory relief of her attention deficit disorder symptoms and helping her compensate for her deficits in relevant contexts. She reports that she is tolerating her current treatment well. she reports no mood instability, tics, or disordered sleep, or other apparent adverse effects. She is demonstrating no behaviors to suggest inappropriate use of prescribed medications. [place] [employer]acy Controlled Prescription Drug Monitoring database was queried and showed no activity to suggest abuse, diversion, or other inappropriate use of prescription medications.     Problem List Items Addressed This Visit     Attention deficit hyperactivity disorder (ADHD), predominantly inattentive type - Primary    Relevant Medications    dextroamphetamine-amphetamine (ADDERALL XR) 30 MG 24 hr capsule    dextroamphetamine-amphetamine (ADDERALL XR) 30 MG 24 hr capsule (Start on 4/15/2018)    dextroamphetamine-amphetamine (ADDERALL XR) 30 MG 24 hr capsule (Start on 5/15/2018)      Other Visit Diagnoses     Attention-deficit hyperactivity disorder, predominantly inattentive type        Relevant Medications    dextroamphetamine-amphetamine (ADDERALL XR) 30 MG 24 hr capsule    dextroamphetamine-amphetamine (ADDERALL XR) 30 MG 24 hr capsule (Start on 4/15/2018)    dextroamphetamine-amphetamine (ADDERALL XR) 30 MG 24 hr capsule (Start on 5/15/2018)          REVIEW OF SYSTEMS  PSYCHIATRIC: No mood instability reported.  NEUROLOGIC: No seizures or tics reported.     PHYSICAL EXAM  Vitals:    03/15/18 0828   BP: 104/72   BP Location: Right arm   Patient Position: Sitting   BP Method: Medium (Manual)   Pulse: 96   Temp: 96.9 °F (36.1 °C)   TempSrc: Tympanic   SpO2: 99%   Weight: 78.4 kg (172 lb 13.5 oz)   Height: 5' 5.5" (1.664 m)     CONST: Vital signs noted. No apparent " distress. Appears well.  PULM: Breathing unlabored.  CV: Heart regular.  DERM: Warm and moist. Normal turgor.  PSYCH: Alert and oriented x 3. Mood is grossly euthymic. Affect appropriate. Judgment and insight not grossly compromised.     PAST MEDICAL HISTORY, FAMILY HISTORY, SOCIAL HISTORY, CURRENT MEDICATION LIST, and ALLERGY LIST reviewed by me (DEVIN Estrada MD) and are updated consistent with the patient's report.    ASSESSMENT and PLAN  Attention deficit hyperactivity disorder (ADHD), predominantly inattentive type  -     dextroamphetamine-amphetamine (ADDERALL XR) 30 MG 24 hr capsule; Take 1 capsule (30 mg total) by mouth every morning.  Dispense: 30 capsule; Refill: 0  -     dextroamphetamine-amphetamine (ADDERALL XR) 30 MG 24 hr capsule; Take 1 capsule (30 mg total) by mouth every morning.  Dispense: 30 capsule; Refill: 0  -     dextroamphetamine-amphetamine (ADDERALL XR) 30 MG 24 hr capsule; Take 1 capsule (30 mg total) by mouth every morning.  Dispense: 30 capsule; Refill: 0    Attention-deficit hyperactivity disorder, predominantly inattentive type  -     dextroamphetamine-amphetamine (ADDERALL XR) 30 MG 24 hr capsule; Take 1 capsule (30 mg total) by mouth every morning.  Dispense: 30 capsule; Refill: 0  -     dextroamphetamine-amphetamine (ADDERALL XR) 30 MG 24 hr capsule; Take 1 capsule (30 mg total) by mouth every morning.  Dispense: 30 capsule; Refill: 0  -     dextroamphetamine-amphetamine (ADDERALL XR) 30 MG 24 hr capsule; Take 1 capsule (30 mg total) by mouth every morning.  Dispense: 30 capsule; Refill: 0        PRESCRIPTION MEDICATION MANAGEMENT  Medication List with Changes/Refills   Changed and/or Refilled Medications    Modified Medication Previous Medication    DEXTROAMPHETAMINE-AMPHETAMINE (ADDERALL XR) 30 MG 24 HR CAPSULE dextroamphetamine-amphetamine (ADDERALL XR) 30 MG 24 hr capsule       Take 1 capsule (30 mg total) by mouth every morning.    Take 1 capsule (30 mg total) by mouth  "every morning.    DEXTROAMPHETAMINE-AMPHETAMINE (ADDERALL XR) 30 MG 24 HR CAPSULE dextroamphetamine-amphetamine (ADDERALL XR) 30 MG 24 hr capsule       Take 1 capsule (30 mg total) by mouth every morning.    Take 1 capsule (30 mg total) by mouth every morning.    DEXTROAMPHETAMINE-AMPHETAMINE (ADDERALL XR) 30 MG 24 HR CAPSULE dextroamphetamine-amphetamine (ADDERALL XR) 30 MG 24 hr capsule       Take 1 capsule (30 mg total) by mouth every morning.    Take 1 capsule (30 mg total) by mouth every morning.       Follow-up in about 3 months (around 6/15/2018).    ABOUT THIS DOCUMENTATION:  · The order of the conditions listed in the HPI is one of convenience and does not necessarily reflect the chronology of the appointment, nor the relative importance of a condition. It is possible that additional description or status details about condition(s) may be found elsewhere in the EHR documentation for today's encounter.  · Documentation entered by me for this encounter was done in part using speech-recognition technology. Although I have made an effort to ensure accuracy, "sound like" errors may exist and should be interpreted in context.                        -DEVIN Estrada MD    There are no Patient Instructions on file for this visit.    "

## 2018-06-15 ENCOUNTER — OFFICE VISIT (OUTPATIENT)
Dept: INTERNAL MEDICINE | Facility: CLINIC | Age: 28
End: 2018-06-15
Payer: MEDICAID

## 2018-06-15 VITALS
BODY MASS INDEX: 27.49 KG/M2 | OXYGEN SATURATION: 99 % | SYSTOLIC BLOOD PRESSURE: 108 MMHG | HEIGHT: 66 IN | TEMPERATURE: 97 F | HEART RATE: 109 BPM | WEIGHT: 171.06 LBS | DIASTOLIC BLOOD PRESSURE: 72 MMHG

## 2018-06-15 DIAGNOSIS — F90.0 ATTENTION DEFICIT HYPERACTIVITY DISORDER (ADHD), PREDOMINANTLY INATTENTIVE TYPE: Primary | ICD-10-CM

## 2018-06-15 PROCEDURE — 99213 OFFICE O/P EST LOW 20 MIN: CPT | Mod: PBBFAC,PO | Performed by: FAMILY MEDICINE

## 2018-06-15 PROCEDURE — 99213 OFFICE O/P EST LOW 20 MIN: CPT | Mod: S$PBB,,, | Performed by: FAMILY MEDICINE

## 2018-06-15 PROCEDURE — 99999 PR PBB SHADOW E&M-EST. PATIENT-LVL III: CPT | Mod: PBBFAC,,, | Performed by: FAMILY MEDICINE

## 2018-06-15 RX ORDER — DEXTROAMPHETAMINE SACCHARATE, AMPHETAMINE ASPARTATE MONOHYDRATE, DEXTROAMPHETAMINE SULFATE AND AMPHETAMINE SULFATE 7.5; 7.5; 7.5; 7.5 MG/1; MG/1; MG/1; MG/1
30 CAPSULE, EXTENDED RELEASE ORAL EVERY MORNING
Qty: 30 CAPSULE | Refills: 0 | Status: SHIPPED | OUTPATIENT
Start: 2018-06-15 | End: 2018-09-11 | Stop reason: SDUPTHER

## 2018-06-15 RX ORDER — DEXTROAMPHETAMINE SACCHARATE, AMPHETAMINE ASPARTATE MONOHYDRATE, DEXTROAMPHETAMINE SULFATE AND AMPHETAMINE SULFATE 7.5; 7.5; 7.5; 7.5 MG/1; MG/1; MG/1; MG/1
30 CAPSULE, EXTENDED RELEASE ORAL EVERY MORNING
Qty: 30 CAPSULE | Refills: 0 | Status: SHIPPED | OUTPATIENT
Start: 2018-06-15 | End: 2018-06-15 | Stop reason: SDUPTHER

## 2018-06-15 RX ORDER — DEXTROAMPHETAMINE SACCHARATE, AMPHETAMINE ASPARTATE MONOHYDRATE, DEXTROAMPHETAMINE SULFATE AND AMPHETAMINE SULFATE 7.5; 7.5; 7.5; 7.5 MG/1; MG/1; MG/1; MG/1
30 CAPSULE, EXTENDED RELEASE ORAL EVERY MORNING
Qty: 30 CAPSULE | Refills: 0 | Status: SHIPPED | OUTPATIENT
Start: 2018-07-15 | End: 2018-09-11 | Stop reason: SDUPTHER

## 2018-06-15 RX ORDER — DEXTROAMPHETAMINE SACCHARATE, AMPHETAMINE ASPARTATE MONOHYDRATE, DEXTROAMPHETAMINE SULFATE AND AMPHETAMINE SULFATE 7.5; 7.5; 7.5; 7.5 MG/1; MG/1; MG/1; MG/1
30 CAPSULE, EXTENDED RELEASE ORAL EVERY MORNING
Qty: 30 CAPSULE | Refills: 0 | Status: SHIPPED | OUTPATIENT
Start: 2018-08-15 | End: 2018-09-11 | Stop reason: SDUPTHER

## 2018-06-15 NOTE — PROGRESS NOTES
"NOTE: Her attention deficit disorder has been stable. I told her that if she continued to do well, she could send me a status update in 2-3 months and request refills, and she would return to see me three months thereafter, assuming her condition remained stable.    CHIEF COMPLAINT  Medication Refill      HISTORY OF PRESENT ILLNESS  She reports that her current treatment is providing satisfactory relief of her attention deficit disorder symptoms and helping her compensate for her deficits in the work she does. She reports that she is tolerating her current treatment well. she reports no mood instability, tics, or disordered sleep, or other apparent adverse effects. She is demonstrating no behaviors to suggest inappropriate use of prescribed medications. [place] Board of Pharmacy Controlled Prescription Drug Monitoring database was queried and showed no activity to suggest abuse, diversion, or other inappropriate use of prescription medications.     Problem List Items Addressed This Visit     None          PAST MEDICAL HISTORY, FAMILY HISTORY and SOCIAL HISTORY, CURRENT MEDICATION LIST, and ALLERGY LIST reviewed by me (DEVIN Estrada MD) and are updated consistent with the patient's report.    REVIEW OF SYSTEMS  PSYCHIATRIC: No mood instability reported.  NEUROLOGIC: No seizures or tics reported.     PHYSICAL EXAM  Vitals:    06/15/18 1323   BP: 108/72   BP Location: Right arm   Patient Position: Sitting   BP Method: Medium (Manual)   Pulse: 109   Temp: 97.2 °F (36.2 °C)   TempSrc: Tympanic   SpO2: 99%   Weight: 77.6 kg (171 lb 1.2 oz)   Height: 5' 5.5" (1.664 m)     CONST: Vital signs noted. No apparent distress. Appears well.  PULM: Lungs clear. Breathing unlabored.  CV: Heart regular.  DERM: Warm and moist. Normal turgor.  PSYCH: Alert and oriented x 3. Mood is grossly euthymic. Affect appropriate. Judgment and insight not grossly compromised.     ASSESSMENT and PLAN  There are no diagnoses linked to this " "encounter.    PRESCRIPTION MEDICATION MANAGEMENT  Except as noted below, CURRENT MEDICATIONS are to remain unchanged from that listed above.     There are no discontinued medications.    No Follow-up on file.    ABOUT THIS DOCUMENTATION:  · The order of the conditions listed in the HPI is one of convenience and does not necessarily reflect the chronology of the appointment, nor the relative importance of a condition. It is possible that additional description or status details about condition(s) may be found elsewhere in the EHR documentation for today's encounter.  · Documentation entered by me for this encounter was done in part using speech-recognition technology. Although I have made an effort to ensure accuracy, "sound like" errors may exist and should be interpreted in context.                        -DEVIN Estrada MD    There are no Patient Instructions on file for this visit.          "

## 2018-08-01 ENCOUNTER — PATIENT MESSAGE (OUTPATIENT)
Dept: INTERNAL MEDICINE | Facility: CLINIC | Age: 28
End: 2018-08-01

## 2018-08-04 ENCOUNTER — PATIENT MESSAGE (OUTPATIENT)
Dept: INTERNAL MEDICINE | Facility: CLINIC | Age: 28
End: 2018-08-04

## 2018-09-11 ENCOUNTER — OFFICE VISIT (OUTPATIENT)
Dept: INTERNAL MEDICINE | Facility: CLINIC | Age: 28
End: 2018-09-11
Payer: MEDICAID

## 2018-09-11 VITALS
HEIGHT: 66 IN | TEMPERATURE: 99 F | DIASTOLIC BLOOD PRESSURE: 78 MMHG | HEART RATE: 80 BPM | SYSTOLIC BLOOD PRESSURE: 116 MMHG | OXYGEN SATURATION: 100 % | WEIGHT: 171.75 LBS | BODY MASS INDEX: 27.6 KG/M2

## 2018-09-11 DIAGNOSIS — F90.0 ATTENTION DEFICIT HYPERACTIVITY DISORDER (ADHD), PREDOMINANTLY INATTENTIVE TYPE: ICD-10-CM

## 2018-09-11 PROCEDURE — 99213 OFFICE O/P EST LOW 20 MIN: CPT | Mod: S$PBB,,, | Performed by: FAMILY MEDICINE

## 2018-09-11 PROCEDURE — 99999 PR PBB SHADOW E&M-EST. PATIENT-LVL III: CPT | Mod: PBBFAC,,, | Performed by: FAMILY MEDICINE

## 2018-09-11 PROCEDURE — 99213 OFFICE O/P EST LOW 20 MIN: CPT | Mod: PBBFAC,PO | Performed by: FAMILY MEDICINE

## 2018-09-11 RX ORDER — DEXTROAMPHETAMINE SACCHARATE, AMPHETAMINE ASPARTATE MONOHYDRATE, DEXTROAMPHETAMINE SULFATE AND AMPHETAMINE SULFATE 7.5; 7.5; 7.5; 7.5 MG/1; MG/1; MG/1; MG/1
30 CAPSULE, EXTENDED RELEASE ORAL EVERY MORNING
Qty: 30 CAPSULE | Refills: 0 | Status: SHIPPED | OUTPATIENT
Start: 2018-09-11 | End: 2019-03-07

## 2018-09-11 RX ORDER — DEXTROAMPHETAMINE SACCHARATE, AMPHETAMINE ASPARTATE MONOHYDRATE, DEXTROAMPHETAMINE SULFATE AND AMPHETAMINE SULFATE 7.5; 7.5; 7.5; 7.5 MG/1; MG/1; MG/1; MG/1
30 CAPSULE, EXTENDED RELEASE ORAL EVERY MORNING
Qty: 30 CAPSULE | Refills: 0 | Status: SHIPPED | OUTPATIENT
Start: 2018-10-11 | End: 2019-03-07

## 2018-09-11 RX ORDER — DEXTROAMPHETAMINE SACCHARATE, AMPHETAMINE ASPARTATE MONOHYDRATE, DEXTROAMPHETAMINE SULFATE AND AMPHETAMINE SULFATE 7.5; 7.5; 7.5; 7.5 MG/1; MG/1; MG/1; MG/1
30 CAPSULE, EXTENDED RELEASE ORAL EVERY MORNING
Qty: 30 CAPSULE | Refills: 0 | Status: SHIPPED | OUTPATIENT
Start: 2018-11-11 | End: 2018-11-26 | Stop reason: SDUPTHER

## 2018-09-11 NOTE — PROGRESS NOTES
NOTE: Her attention deficit disorder has been stable. I told her that if she continued to do well, she could send me a status update in 2-3 months and request refills, and she would return to see me three months thereafter, assuming her condition remained stable.     CHIEF COMPLAINT  Medication Refill      HISTORY OF PRESENT ILLNESS  She reports that her current treatment is providing satisfactory relief of her attention deficit disorder symptoms and helping her compensate for her deficits. She reports that she is tolerating her current treatment well. She reports no mood instability, tics, or disordered sleep, or other apparent adverse effects. She is demonstrating no behaviors to suggest inappropriate use of prescribed medications. [place] Board [employer]acy Controlled Prescription Drug Monitoring database was queried and showed no activity to suggest abuse, diversion, or other inappropriate use of prescription medications.     Problem List Items Addressed This Visit        Psychiatric    Attention deficit hyperactivity disorder (ADHD), predominantly inattentive type    Relevant Medications    dextroamphetamine-amphetamine (ADDERALL XR) 30 MG 24 hr capsule    dextroamphetamine-amphetamine (ADDERALL XR) 30 MG 24 hr capsule (Start on 10/11/2018)    dextroamphetamine-amphetamine (ADDERALL XR) 30 MG 24 hr capsule (Start on 11/11/2018)          PAST MEDICAL HISTORY, FAMILY HISTORY and SOCIAL HISTORY, CURRENT MEDICATION LIST, and ALLERGY LIST reviewed by me (DEVIN Estrada MD) and are updated consistent with the patient's report.    REVIEW OF SYSTEMS  PSYCHIATRIC: No mood instability reported.  NEUROLOGIC: No seizures or tics reported.   Answers for HPI/ROS submitted by the patient on 9/11/2018    activity change: No   unexpected weight change: No   neck pain: No   hearing loss: No   rhinorrhea: No   trouble swallowing: No   eye discharge: No   visual disturbance: No   chest tightness: No   wheezing: No   chest  "pain: No   palpitations: No   blood in stool: No   constipation: No   vomiting: No   diarrhea: No   polydipsia: No   polyuria: No   difficulty urinating: No   hematuria: No   menstrual problem: No   dysuria: No   joint swelling: No   arthralgias: No   headaches: No   weakness: No   confusion: No   dysphoric mood: No    PHYSICAL EXAM  Vitals:    09/11/18 0853   BP: 116/78   BP Location: Right arm   Patient Position: Sitting   BP Method: Medium (Manual)   Pulse: 80   Temp: 99 °F (37.2 °C)   TempSrc: Tympanic   SpO2: 100%   Weight: 77.9 kg (171 lb 11.8 oz)   Height: 5' 5.5" (1.664 m)     CONST: Vital signs noted. No apparent distress. Appears well.  PULM: Breathing unlabored.  CV: Heart regular.  DERM: Warm and moist. Normal turgor.  PSYCH: Alert and oriented x 3. Mood is grossly euthymic. Affect appropriate. Judgment and insight not grossly compromised.     ASSESSMENT and PLAN  Attention deficit hyperactivity disorder (ADHD), predominantly inattentive type  -     dextroamphetamine-amphetamine (ADDERALL XR) 30 MG 24 hr capsule; Take 1 capsule (30 mg total) by mouth every morning.  Dispense: 30 capsule; Refill: 0  -     dextroamphetamine-amphetamine (ADDERALL XR) 30 MG 24 hr capsule; Take 1 capsule (30 mg total) by mouth every morning.  Dispense: 30 capsule; Refill: 0  -     dextroamphetamine-amphetamine (ADDERALL XR) 30 MG 24 hr capsule; Take 1 capsule (30 mg total) by mouth every morning.  Dispense: 30 capsule; Refill: 0        PRESCRIPTION MEDICATION MANAGEMENT  Except as noted below, CURRENT MEDICATIONS are to remain unchanged from that listed above.  Medications Ordered This Encounter   Medications    dextroamphetamine-amphetamine (ADDERALL XR) 30 MG 24 hr capsule     Sig: Take 1 capsule (30 mg total) by mouth every morning.     Dispense:  30 capsule     Refill:  0           Order Specific Question:   This medication typically requires a prior authorization. For prior auth services, patient financial assistance, " "patient education and medication management send to an [facility] retail pharmacy.     Answer:   No, I will select a different retail pharmacy    dextroamphetamine-amphetamine (ADDERALL XR) 30 MG 24 hr capsule     Sig: Take 1 capsule (30 mg total) by mouth every morning.     Dispense:  30 capsule     Refill:  0           Order Specific Question:   This medication typically requires a prior authorization. For prior auth services, patient financial assistance, patient education and medication management send to an [facility] retail pharmacy.     Answer:   No, I will select a different retail pharmacy    dextroamphetamine-amphetamine (ADDERALL XR) 30 MG 24 hr capsule     Sig: Take 1 capsule (30 mg total) by mouth every morning.     Dispense:  30 capsule     Refill:  0           Order Specific Question:   This medication typically requires a prior authorization. For prior auth services, patient financial assistance, patient education and medication management send to an [facility] retail pharmacy.     Answer:   No, I will select a different retail pharmacy     Medications Discontinued During This Encounter   Medication Reason    dextroamphetamine-amphetamine (ADDERALL XR) 30 MG 24 hr capsule Reorder    dextroamphetamine-amphetamine (ADDERALL XR) 30 MG 24 hr capsule Reorder    dextroamphetamine-amphetamine (ADDERALL XR) 30 MG 24 hr capsule Reorder       Follow-up in about 6 months (around 3/11/2019).    There are no Patient Instructions on file for this visit.    ABOUT THIS DOCUMENTATION:  · The order of the conditions listed in the HPI is one of convenience and does not necessarily reflect the chronology of the appointment, nor the relative importance of a condition.  · Documentation entered by me for this encounter was done in part using speech-recognition technology. Although I have made an effort to ensure accuracy, "sound like" errors may exist and should be interpreted in context.                        KODY Guaman " MD Sean

## 2018-11-24 ENCOUNTER — PATIENT MESSAGE (OUTPATIENT)
Dept: INTERNAL MEDICINE | Facility: CLINIC | Age: 28
End: 2018-11-24

## 2018-11-26 DIAGNOSIS — F90.0 ATTENTION DEFICIT HYPERACTIVITY DISORDER (ADHD), PREDOMINANTLY INATTENTIVE TYPE: ICD-10-CM

## 2018-11-26 NOTE — TELEPHONE ENCOUNTER
Patient is requesting refill on adderall. Pharmacy verified ([facility]).//ddw

## 2018-11-27 RX ORDER — DEXTROAMPHETAMINE SACCHARATE, AMPHETAMINE ASPARTATE MONOHYDRATE, DEXTROAMPHETAMINE SULFATE AND AMPHETAMINE SULFATE 7.5; 7.5; 7.5; 7.5 MG/1; MG/1; MG/1; MG/1
30 CAPSULE, EXTENDED RELEASE ORAL EVERY MORNING
Qty: 30 CAPSULE | Refills: 0 | Status: SHIPPED | OUTPATIENT
Start: 2018-12-27 | End: 2019-03-07

## 2018-11-27 RX ORDER — DEXTROAMPHETAMINE SACCHARATE, AMPHETAMINE ASPARTATE MONOHYDRATE, DEXTROAMPHETAMINE SULFATE AND AMPHETAMINE SULFATE 7.5; 7.5; 7.5; 7.5 MG/1; MG/1; MG/1; MG/1
30 CAPSULE, EXTENDED RELEASE ORAL EVERY MORNING
Qty: 30 CAPSULE | Refills: 0 | Status: SHIPPED | OUTPATIENT
Start: 2018-11-27 | End: 2019-03-07

## 2018-11-27 NOTE — TELEPHONE ENCOUNTER
At her last appointment she was doing well from the standpoint of her ADD, and she is requesting continuation of treatment. [place] Board of [employer]acy Controlled Prescription Drug Monitoring database was queried and showed no activity to suggest abuse, diversion, or other inappropriate use of prescription medications.       Alfie Abad.    I received your request for refill of your ADD medicine.    As we discussed at your previous appointment, I refilled your ADD medicine assuming that: (1) You feel the medicine is working well for you; (2) You are tolerating the medicine without significant side-effects; and (3) You want to continue taking the medicine.    If this is not the case, please schedule an appointment with me ASAP to discuss other treatment options.    Unless you specified a pharmacy in your message, I sent your refills to the same pharmacy you used before. You will need to  your medications from that pharmacy, because this type of prescription cannot be transferred from pharmacy to pharmacy.    I must see you in the office for re-evaluation prior to giving you any more refills, so be sure to schedule an appointment with me in 3 months (or before you need any additional refills).    Thanks for letting me care for you.    Sincerely,     DEVIN Estrada MD  --------------------------------------------------------------------------------   Medications Ordered This Encounter   Medications    dextroamphetamine-amphetamine (ADDERALL XR) 30 MG 24 hr capsule    dextroamphetamine-amphetamine (ADDERALL XR) 30 MG 24 hr capsule    dextroamphetamine-amphetamine (ADDERALL XR) 30 MG 24 hr capsule

## 2019-01-08 ENCOUNTER — PATIENT MESSAGE (OUTPATIENT)
Dept: INTERNAL MEDICINE | Facility: CLINIC | Age: 29
End: 2019-01-08

## 2019-01-14 NOTE — TELEPHONE ENCOUNTER
See [employer] message. PA request initiated to pt's insurance for Adderall XR 30mg rx, submitted online via [email or web address] Request [id number].

## 2019-01-16 ENCOUNTER — TELEPHONE (OUTPATIENT)
Dept: INTERNAL MEDICINE | Facility: CLINIC | Age: 29
End: 2019-01-16

## 2019-01-16 NOTE — TELEPHONE ENCOUNTER
"Fax received from pt's insurance stating PA requested on Adderall XR 30mg not needed, stating "medication does not require prior authorization," PA Ref # [id number]. Faxed letter to pt's pharmacy instructing them to fill rx ASAP and notify pt when rx ready, fax transmission confirmed F#[phone].  "

## 2019-01-23 ENCOUNTER — TELEPHONE (OUTPATIENT)
Dept: INTERNAL MEDICINE | Facility: CLINIC | Age: 29
End: 2019-01-23

## 2019-03-07 ENCOUNTER — OFFICE VISIT (OUTPATIENT)
Dept: INTERNAL MEDICINE | Facility: CLINIC | Age: 29
End: 2019-03-07
Payer: MEDICAID

## 2019-03-07 VITALS
DIASTOLIC BLOOD PRESSURE: 70 MMHG | SYSTOLIC BLOOD PRESSURE: 108 MMHG | HEIGHT: 63 IN | OXYGEN SATURATION: 99 % | HEART RATE: 100 BPM | WEIGHT: 174.38 LBS | BODY MASS INDEX: 30.9 KG/M2 | TEMPERATURE: 96 F

## 2019-03-07 DIAGNOSIS — Z12.4 PAP SMEAR FOR CERVICAL CANCER SCREENING: Primary | ICD-10-CM

## 2019-03-07 DIAGNOSIS — F90.0 ATTENTION DEFICIT HYPERACTIVITY DISORDER (ADHD), PREDOMINANTLY INATTENTIVE TYPE: ICD-10-CM

## 2019-03-07 PROCEDURE — 99999 PR PBB SHADOW E&M-EST. PATIENT-LVL IV: ICD-10-PCS | Mod: PBBFAC,,, | Performed by: FAMILY MEDICINE

## 2019-03-07 PROCEDURE — 99214 OFFICE O/P EST MOD 30 MIN: CPT | Mod: PBBFAC,PN | Performed by: FAMILY MEDICINE

## 2019-03-07 PROCEDURE — 99999 PR PBB SHADOW E&M-EST. PATIENT-LVL IV: CPT | Mod: PBBFAC,,, | Performed by: FAMILY MEDICINE

## 2019-03-07 PROCEDURE — 99213 PR OFFICE/OUTPT VISIT, EST, LEVL III, 20-29 MIN: ICD-10-PCS | Mod: S$PBB,,, | Performed by: FAMILY MEDICINE

## 2019-03-07 PROCEDURE — 99213 OFFICE O/P EST LOW 20 MIN: CPT | Mod: S$PBB,,, | Performed by: FAMILY MEDICINE

## 2019-03-07 RX ORDER — DEXTROAMPHETAMINE SACCHARATE, AMPHETAMINE ASPARTATE MONOHYDRATE, DEXTROAMPHETAMINE SULFATE AND AMPHETAMINE SULFATE 7.5; 7.5; 7.5; 7.5 MG/1; MG/1; MG/1; MG/1
30 CAPSULE, EXTENDED RELEASE ORAL EVERY MORNING
Qty: 30 CAPSULE | Refills: 0 | Status: SHIPPED | OUTPATIENT
Start: 2019-05-07 | End: 2019-05-14 | Stop reason: SDUPTHER

## 2019-03-07 RX ORDER — DEXTROAMPHETAMINE SACCHARATE, AMPHETAMINE ASPARTATE MONOHYDRATE, DEXTROAMPHETAMINE SULFATE AND AMPHETAMINE SULFATE 7.5; 7.5; 7.5; 7.5 MG/1; MG/1; MG/1; MG/1
30 CAPSULE, EXTENDED RELEASE ORAL EVERY MORNING
Qty: 30 CAPSULE | Refills: 0 | Status: SHIPPED | OUTPATIENT
Start: 2019-04-07 | End: 2019-05-14 | Stop reason: SDUPTHER

## 2019-03-07 RX ORDER — DEXTROAMPHETAMINE SACCHARATE, AMPHETAMINE ASPARTATE MONOHYDRATE, DEXTROAMPHETAMINE SULFATE AND AMPHETAMINE SULFATE 7.5; 7.5; 7.5; 7.5 MG/1; MG/1; MG/1; MG/1
30 CAPSULE, EXTENDED RELEASE ORAL EVERY MORNING
Qty: 30 CAPSULE | Refills: 0 | Status: SHIPPED | OUTPATIENT
Start: 2019-03-07 | End: 2019-05-14 | Stop reason: SDUPTHER

## 2019-03-07 NOTE — ASSESSMENT & PLAN NOTE
She reports that her current treatment is providing satisfactory relief of her attention deficit disorder symptoms and helping her compensate for her deficits in school. She reports that she is tolerating her current treatment well. She reports no mood instability, tics, or disordered sleep, or other apparent adverse effects. She is demonstrating no behaviors to suggest inappropriate use of prescribed medications. [place] Board of Pharmacy Controlled Prescription Drug Monitoring database was queried and showed no activity to suggest abuse, diversion, or other inappropriate use of prescription medications.

## 2019-03-24 NOTE — PROGRESS NOTES
CHIEF COMPLAINT  Medication Refill      ENCOUNTER DIAGNOSES  1. Pap smear for cervical cancer screening    2. Attention deficit hyperactivity disorder (ADHD), predominantly inattentive type        HPI, ASSESSMENT, and ASSOCIATED ORDERS  Problem List Items Addressed This Visit        Psychiatric    Attention deficit hyperactivity disorder (ADHD), predominantly inattentive type    Current Assessment & Plan     She reports that her current treatment is providing satisfactory relief of her attention deficit disorder symptoms and helping her compensate for her deficits in school. She reports that she is tolerating her current treatment well. She reports no mood instability, tics, or disordered sleep, or other apparent adverse effects. She is demonstrating no behaviors to suggest inappropriate use of prescribed medications. [place] Board of Pharmacy Controlled Prescription Drug Monitoring database was queried and showed no activity to suggest abuse, diversion, or other inappropriate use of prescription medications.         Relevant Medications    dextroamphetamine-amphetamine (ADDERALL XR) 30 MG 24 hr capsule    dextroamphetamine-amphetamine (ADDERALL XR) 30 MG 24 hr capsule (Start on 4/7/2019)    dextroamphetamine-amphetamine (ADDERALL XR) 30 MG 24 hr capsule (Start on 5/7/2019)      Other Visit Diagnoses     Pap smear for cervical cancer screening    -  Primary    She prefers GYN to perform.    Relevant Orders    Ambulatory referral to Gynecology        COMMENT: If no new orders identified, then plan is to continue current treatment, including any related prescription medications.    REVIEW OF SYSTEMS  Answers for HPI/ROS submitted by the patient on 3/7/2019   activity change: No  unexpected weight change: No  neck pain: No  hearing loss: No  rhinorrhea: No  trouble swallowing: No  eye discharge: No  visual disturbance: No  chest tightness: No  wheezing: No  chest pain: No  palpitations: No  blood in stool:  "No  constipation: No  vomiting: No  diarrhea: No  polydipsia: No  polyuria: No  difficulty urinating: No  hematuria: No  menstrual problem: No  dysuria: No  joint swelling: No  arthralgias: No  headaches: No  weakness: No  confusion: No  dysphoric mood: No       PHYSICAL EXAM  Vitals:    03/07/19 1407   BP: 108/70   BP Location: Right arm   Patient Position: Sitting   BP Method: Large (Manual)   Pulse: 100   Temp: 96.1 °F (35.6 °C)   TempSrc: Tympanic   SpO2: 99%   Weight: 79.1 kg (174 lb 6.1 oz)   Height: 5' 3" (1.6 m)     CONSTITUTIONAL: Vital signs noted. No apparent distress. Does not appear acutely ill or septic. Appears adequately hydrated.  PULM: Breathing unlabored.  PSYCHIATRIC: Alert and conversant and grossly oriented. Mood is grossly neutral. Affect appropriate. Judgment and insight not grossly compromised.     Follow-up in about 2 months (around 5/16/2019) for re-evaluate problem(s) discussed today.    Documentation entered by me for this encounter may have been done in part using speech-recognition technology. Although I have made an effort to ensure accuracy, "sound like" errors may exist and should be interpreted in context. -DEVIN Estrada MD      There are no Patient Instructions on file for this visit.   "

## 2019-04-25 ENCOUNTER — PATIENT OUTREACH (OUTPATIENT)
Dept: ADMINISTRATIVE | Facility: HOSPITAL | Age: 29
End: 2019-04-25

## 2019-04-25 NOTE — PROGRESS NOTES
Patient contacted to possibly schedule Cervical Cancer screening.  Left a detailed vm message including call back number

## 2019-05-09 ENCOUNTER — PATIENT MESSAGE (OUTPATIENT)
Dept: INTERNAL MEDICINE | Facility: CLINIC | Age: 29
End: 2019-05-09

## 2019-05-13 ENCOUNTER — TELEPHONE (OUTPATIENT)
Dept: INTERNAL MEDICINE | Facility: CLINIC | Age: 29
End: 2019-05-13

## 2019-05-13 NOTE — TELEPHONE ENCOUNTER
----- Message from Cyndi Asencio sent at 5/13/2019  1:37 PM CDT -----  Contact: PATIENT  CALLING CONCERNING HER VIDEO APPOINTMENT  1:20 PM TODAY. PLEASE CALL PATIENT ASAP @ [phone]. THANKS, MEAGHAN

## 2019-05-13 NOTE — TELEPHONE ENCOUNTER
Spoke with patient and she stated that when she tried to do her video visit today, an error message came up so she couldn't do the visit that way. She has scheduled to come in tomorrow for a visit.

## 2019-05-14 ENCOUNTER — PATIENT MESSAGE (OUTPATIENT)
Dept: ADMINISTRATIVE | Facility: HOSPITAL | Age: 29
End: 2019-05-14

## 2019-05-14 ENCOUNTER — OFFICE VISIT (OUTPATIENT)
Dept: INTERNAL MEDICINE | Facility: CLINIC | Age: 29
End: 2019-05-14
Payer: MEDICAID

## 2019-05-14 VITALS
RESPIRATION RATE: 20 BRPM | OXYGEN SATURATION: 99 % | HEIGHT: 63 IN | DIASTOLIC BLOOD PRESSURE: 74 MMHG | BODY MASS INDEX: 32.38 KG/M2 | WEIGHT: 182.75 LBS | HEART RATE: 83 BPM | TEMPERATURE: 98 F | SYSTOLIC BLOOD PRESSURE: 118 MMHG

## 2019-05-14 DIAGNOSIS — F90.0 ATTENTION DEFICIT HYPERACTIVITY DISORDER (ADHD), PREDOMINANTLY INATTENTIVE TYPE: Primary | ICD-10-CM

## 2019-05-14 DIAGNOSIS — Z12.4 PAP SMEAR FOR CERVICAL CANCER SCREENING: ICD-10-CM

## 2019-05-14 PROCEDURE — 99214 OFFICE O/P EST MOD 30 MIN: CPT | Mod: PBBFAC,PN | Performed by: FAMILY MEDICINE

## 2019-05-14 PROCEDURE — 99213 OFFICE O/P EST LOW 20 MIN: CPT | Mod: S$PBB,,, | Performed by: FAMILY MEDICINE

## 2019-05-14 PROCEDURE — 99213 PR OFFICE/OUTPT VISIT, EST, LEVL III, 20-29 MIN: ICD-10-PCS | Mod: S$PBB,,, | Performed by: FAMILY MEDICINE

## 2019-05-14 PROCEDURE — 99999 PR PBB SHADOW E&M-EST. PATIENT-LVL IV: CPT | Mod: PBBFAC,,, | Performed by: FAMILY MEDICINE

## 2019-05-14 PROCEDURE — 99999 PR PBB SHADOW E&M-EST. PATIENT-LVL IV: ICD-10-PCS | Mod: PBBFAC,,, | Performed by: FAMILY MEDICINE

## 2019-05-14 RX ORDER — DEXTROAMPHETAMINE SACCHARATE, AMPHETAMINE ASPARTATE MONOHYDRATE, DEXTROAMPHETAMINE SULFATE AND AMPHETAMINE SULFATE 7.5; 7.5; 7.5; 7.5 MG/1; MG/1; MG/1; MG/1
30 CAPSULE, EXTENDED RELEASE ORAL EVERY MORNING
Qty: 30 CAPSULE | Refills: 0 | Status: SHIPPED | OUTPATIENT
Start: 2019-05-14 | End: 2019-08-15 | Stop reason: SDUPTHER

## 2019-05-14 RX ORDER — DEXTROAMPHETAMINE SACCHARATE, AMPHETAMINE ASPARTATE MONOHYDRATE, DEXTROAMPHETAMINE SULFATE AND AMPHETAMINE SULFATE 7.5; 7.5; 7.5; 7.5 MG/1; MG/1; MG/1; MG/1
30 CAPSULE, EXTENDED RELEASE ORAL EVERY MORNING
Qty: 30 CAPSULE | Refills: 0 | Status: SHIPPED | OUTPATIENT
Start: 2019-06-14 | End: 2019-08-15 | Stop reason: SDUPTHER

## 2019-05-14 RX ORDER — DEXTROAMPHETAMINE SACCHARATE, AMPHETAMINE ASPARTATE MONOHYDRATE, DEXTROAMPHETAMINE SULFATE AND AMPHETAMINE SULFATE 7.5; 7.5; 7.5; 7.5 MG/1; MG/1; MG/1; MG/1
30 CAPSULE, EXTENDED RELEASE ORAL EVERY MORNING
Qty: 30 CAPSULE | Refills: 0 | Status: SHIPPED | OUTPATIENT
Start: 2019-07-14 | End: 2019-08-15 | Stop reason: SDUPTHER

## 2019-05-15 ENCOUNTER — OFFICE VISIT (OUTPATIENT)
Dept: OBSTETRICS AND GYNECOLOGY | Facility: CLINIC | Age: 29
End: 2019-05-15
Payer: MEDICAID

## 2019-05-15 VITALS
WEIGHT: 180.13 LBS | DIASTOLIC BLOOD PRESSURE: 78 MMHG | BODY MASS INDEX: 31.92 KG/M2 | SYSTOLIC BLOOD PRESSURE: 110 MMHG | HEIGHT: 63 IN

## 2019-05-15 DIAGNOSIS — Z01.419 ENCOUNTER FOR GYNECOLOGICAL EXAMINATION WITHOUT ABNORMAL FINDING: Primary | ICD-10-CM

## 2019-05-15 PROCEDURE — 99385 PR PREVENTIVE VISIT,NEW,18-39: ICD-10-PCS | Mod: S$PBB,,, | Performed by: OBSTETRICS & GYNECOLOGY

## 2019-05-15 PROCEDURE — 99999 PR PBB SHADOW E&M-EST. PATIENT-LVL III: CPT | Mod: PBBFAC,,, | Performed by: OBSTETRICS & GYNECOLOGY

## 2019-05-15 PROCEDURE — 99385 PREV VISIT NEW AGE 18-39: CPT | Mod: S$PBB,,, | Performed by: OBSTETRICS & GYNECOLOGY

## 2019-05-15 PROCEDURE — 88175 CYTOPATH C/V AUTO FLUID REDO: CPT

## 2019-05-15 PROCEDURE — 99213 OFFICE O/P EST LOW 20 MIN: CPT | Mod: PBBFAC | Performed by: OBSTETRICS & GYNECOLOGY

## 2019-05-15 PROCEDURE — 99999 PR PBB SHADOW E&M-EST. PATIENT-LVL III: ICD-10-PCS | Mod: PBBFAC,,, | Performed by: OBSTETRICS & GYNECOLOGY

## 2019-05-15 NOTE — LETTER
May 19, 2019      DEVIN Estrada MD  [address]  [place] [address]           [place]  [facility]  [address] [facility] [address]  Phone: [phone]  Fax: [phone]          Patient: Alfie Carroll   MR Number: [id number]   YOB: 1990   Date of Visit: 5/15/2019       Dear Dr. DEVIN Estrada:    Thank you for referring Alfie Carroll to me for evaluation. Attached you will find relevant portions of my assessment and plan of care.    If you have questions, please do not hesitate to call me. I look forward to following Alfie Carroll along with you.    Sincerely,    Chuyita Lynn MD    Enclosure  CC:  No Recipients    If you would like to receive this communication electronically, please contact [email or web address] or [phone] to request more information on [employer] access.    For providers and/or their staff who would like to refer a patient to [facility], please contact us through our one-stop-shop provider referral line, [facility], at [phone].    If you feel you have received this communication in error or would no longer like to receive these types of communications, please e-mail [email or web address]

## 2019-05-16 NOTE — PROGRESS NOTES
CC: Well woman exam    Alfie Carroll is a 29 y.o. female  presents as new pt for a well woman exam.  LMP: Patient's last menstrual period was 2019 (within days)..  No issues, problems, or complaints. Declines contraception & std screening    Past Medical History:   Diagnosis Date    Attention deficit hyperactivity disorder (ADHD), predominantly inattentive type      Past Surgical History:   Procedure Laterality Date    VAGINAL DELIVERY      X 1     Social History     Socioeconomic History    Marital status: Single     Spouse name: Not on file    Number of children: 1    Years of education: Some College    Highest education level: Not on file   Occupational History    Not on file   Social Needs    Financial resource strain: Not on file    Food insecurity:     Worry: Not on file     Inability: Not on file    Transportation needs:     Medical: No     Non-medical: No   Tobacco Use    Smoking status: Never Smoker    Smokeless tobacco: Never Used   Substance and Sexual Activity    Alcohol use: No     Frequency: Monthly or less     Drinks per session: 1 or 2     Binge frequency: Never    Drug use: No    Sexual activity: Never     Partners: Male   Lifestyle    Physical activity:     Days per week: 2 days     Minutes per session: 10 min    Stress: Only a little   Relationships    Social connections:     Talks on phone: Once a week     Gets together: Never     Attends [employer] service: Not on file     Active member of club or organization: No     Attends meetings of clubs or organizations: Never     Relationship status: Never    Other Topics Concern    Not on file   Social History Narrative    Full-time parent.     Family History   Problem Relation Age of Onset    Hypertension Mother      OB History        1    Para   1    Term   1            AB        Living   1       SAB        TAB        Ectopic        Multiple        Live Births   1                 Current Outpatient  "Medications:     dextroamphetamine-amphetamine (ADDERALL XR) 30 MG 24 hr capsule, Take 1 capsule (30 mg total) by mouth every morning., Disp: 30 capsule, Rfl: 0    [START ON 7/14/2019] dextroamphetamine-amphetamine (ADDERALL XR) 30 MG 24 hr capsule, Take 1 capsule (30 mg total) by mouth every morning., Disp: 30 capsule, Rfl: 0    [START ON 6/14/2019] dextroamphetamine-amphetamine (ADDERALL XR) 30 MG 24 hr capsule, Take 1 capsule (30 mg total) by mouth every morning., Disp: 30 capsule, Rfl: 0    GYNECOLOGY HISTORY:  No abnormal pap/std    DATA REVIEWED:  Last pap: 8 y ago Results: normal    /78 (BP Location: Right arm)   Ht 5' 3" (1.6 m)   Wt 81.7 kg (180 lb 1.9 oz)   LMP 05/01/2019 (Within Days)   BMI 31.91 kg/m²     ROS:  GENERAL: Denies weight gain or weight loss. Feeling well overall.   SKIN: Denies rash or lesions.   HEAD: Denies head injury or headache.   NODES: Denies enlarged lymph nodes.   CHEST: Denies chest pain or shortness of breath.   CARDIOVASCULAR: Denies palpitations or left sided chest pain.   ABDOMEN: No abdominal pain, constipation, diarrhea, nausea, vomiting or rectal bleeding.   URINARY: No frequency, dysuria, hematuria, or burning on urination.  REPRODUCTIVE: See HPI.   BREASTS: The patient denies pain, lumps, or nipple discharge.   HEMATOLOGIC: No easy bruisability or excessive bleeding.   MUSCULOSKELETAL: Denies joint pain or swelling.   NEUROLOGIC: Denies syncope or weakness.   PSYCHIATRIC: Denies depression, anxiety or mood swings.    PHYSICAL EXAM:    APPEARANCE: Well nourished, well developed, in no acute distress.  AFFECT: WNL, alert and oriented x 3  SKIN: No acne or hirsutism  NECK: Neck symmetric without masses or thyromegaly  NODES: No inguinal, cervical, axillary, or femoral lymph node enlargement  CHEST: Good respiratory effect  ABDOMEN: Soft.  No tenderness or masses.  No hepatosplenomegaly.  No hernias.  BREASTS: Symmetrical, no skin changes or visible lesions.  No " palpable masses, nipple discharge bilaterally.  PELVIC: Normal external genitalia without lesions.  Normal hair distribution.  Adequate perineal body, normal urethral meatus.  Vagina moist and well rugated without lesions or discharge.  Cervix pink, without lesions, discharge or tenderness.  No significant cystocele or rectocele.  Bimanual exam shows uterus to be normal size, regular, mobile and nontender.  Adnexa without masses or tenderness.    EXTREMITIES: No edema.    Encounter for gynecological examination without abnormal finding  -     Liquid-based pap smear, screening    Patient was counseled today on A.C.S. Pap guidelines (Q3) and recommendations for yearly pelvic exams, yearly mammograms starting age 40, and clinical breast exams; to see her PCP for other health maintenance.

## 2019-05-20 ENCOUNTER — PATIENT MESSAGE (OUTPATIENT)
Dept: OBSTETRICS AND GYNECOLOGY | Facility: CLINIC | Age: 29
End: 2019-05-20

## 2019-05-30 NOTE — PROGRESS NOTES
Alfie Abad.    I'm proud of you for getting your Pap test done, and I'm happy that it did NOT show evidence of cancer.    To learn more about the prevention and early detection of cervical cancer, visit [email or web address]    Thanks for letting me care for you.    Sincerely,    DEVIN Estrada MD

## 2019-05-30 NOTE — PROGRESS NOTES
CHIEF COMPLAINT  Medication Refill      HISTORY, ASSESSMENT, and PLAN    Problem List Items Addressed This Visit        Psychiatric    Attention deficit hyperactivity disorder (ADHD), predominantly inattentive type - Primary  ASSESSMENT: She reports that her current treatment is providing satisfactory relief of her attention deficit disorder symptoms and helping her compensate for her deficits in school. She reports that she is tolerating her current treatment well. She reports no mood instability, tics, or disordered sleep, or other apparent adverse effects. She is demonstrating no behaviors to suggest inappropriate use of prescribed medications. [place] Board of Pharmacy Controlled Prescription Drug Monitoring database was queried and showed no activity to suggest abuse, diversion, or other inappropriate use of prescription medications.       Relevant Medications    dextroamphetamine-amphetamine (ADDERALL XR) 30 MG 24 hr capsule    dextroamphetamine-amphetamine (ADDERALL XR) 30 MG 24 hr capsule (Start on 7/14/2019)    dextroamphetamine-amphetamine (ADDERALL XR) 30 MG 24 hr capsule (Start on 6/14/2019)      Other Visit Diagnoses     Pap smear for cervical cancer screening        Relevant Orders    Ambulatory referral to Gynecology           Outpatient Medications Prior to Visit   Medication Sig Dispense Refill    dextroamphetamine-amphetamine (ADDERALL XR) 30 MG 24 hr capsule Take 1 capsule (30 mg total) by mouth every morning. 30 capsule 0    dextroamphetamine-amphetamine (ADDERALL XR) 30 MG 24 hr capsule Take 1 capsule (30 mg total) by mouth every morning. 30 capsule 0    dextroamphetamine-amphetamine (ADDERALL XR) 30 MG 24 hr capsule Take 1 capsule (30 mg total) by mouth every morning. 30 capsule 0     No facility-administered medications prior to visit.         Medications Ordered This Encounter   Medications    dextroamphetamine-amphetamine (ADDERALL XR) 30 MG 24 hr capsule     Sig: Take 1 capsule (30 mg  total) by mouth every morning.     Dispense:  30 capsule     Refill:  0           Order Specific Question:   This medication typically requires a prior authorization. For prior auth services, patient financial assistance, patient education and medication management send to an [facility] retail pharmacy.     Answer:   No, I will select a different retail pharmacy    dextroamphetamine-amphetamine (ADDERALL XR) 30 MG 24 hr capsule     Sig: Take 1 capsule (30 mg total) by mouth every morning.     Dispense:  30 capsule     Refill:  0           Order Specific Question:   This medication typically requires a prior authorization. For prior auth services, patient financial assistance, patient education and medication management send to an [facility] retail pharmacy.     Answer:   No, I will select a different retail pharmacy    dextroamphetamine-amphetamine (ADDERALL XR) 30 MG 24 hr capsule     Sig: Take 1 capsule (30 mg total) by mouth every morning.     Dispense:  30 capsule     Refill:  0           Order Specific Question:   This medication typically requires a prior authorization. For prior auth services, patient financial assistance, patient education and medication management send to an [facility] retail pharmacy.     Answer:   No, I will select a different retail pharmacy       Medications Discontinued During This Encounter   Medication Reason    dextroamphetamine-amphetamine (ADDERALL XR) 30 MG 24 hr capsule Reorder    dextroamphetamine-amphetamine (ADDERALL XR) 30 MG 24 hr capsule Reorder    dextroamphetamine-amphetamine (ADDERALL XR) 30 MG 24 hr capsule Reorder        Follow up in about 3 months (around 8/14/2019) for re-evaluate problem(s) discussed today.    REVIEW OF SYSTEMS  PSYCHIATRIC: No mood instability reported.  NEUROLOGIC: No seizures or tics reported.     PHYSICAL EXAM  Vitals:    05/14/19 1434   BP: 118/74   BP Location: Left arm   Patient Position: Sitting   BP Method: Medium (Manual)   Pulse: 83   Resp:  "20   Temp: 98.1 °F (36.7 °C)   TempSrc: Tympanic   SpO2: 99%   Weight: 82.9 kg (182 lb 12.2 oz)   Height: 5' 3" (1.6 m)     CONSTITUTIONAL: Vital signs noted. No apparent distress. Does not appear acutely ill or septic. Appears adequately hydrated.  PULM: Breathing unlabored.  PSYCHIATRIC: Alert and conversant and grossly oriented. Mood is grossly neutral. Affect appropriate. Judgment and insight not grossly compromised.     Documentation entered by me for this encounter may have been done in part using speech-recognition technology. Although I have made an effort to ensure accuracy, "sound like" errors may exist and should be interpreted in context. -DEVIN Estrada MD.   "

## 2019-08-15 ENCOUNTER — OFFICE VISIT (OUTPATIENT)
Dept: INTERNAL MEDICINE | Facility: CLINIC | Age: 29
End: 2019-08-15
Payer: MEDICAID

## 2019-08-15 VITALS
HEART RATE: 97 BPM | OXYGEN SATURATION: 99 % | TEMPERATURE: 99 F | WEIGHT: 171.31 LBS | SYSTOLIC BLOOD PRESSURE: 106 MMHG | HEIGHT: 65 IN | DIASTOLIC BLOOD PRESSURE: 76 MMHG | BODY MASS INDEX: 28.54 KG/M2

## 2019-08-15 DIAGNOSIS — F90.0 ATTENTION DEFICIT HYPERACTIVITY DISORDER (ADHD), PREDOMINANTLY INATTENTIVE TYPE: Primary | ICD-10-CM

## 2019-08-15 PROCEDURE — 99999 PR PBB SHADOW E&M-EST. PATIENT-LVL III: ICD-10-PCS | Mod: PBBFAC,,, | Performed by: FAMILY MEDICINE

## 2019-08-15 PROCEDURE — 99213 PR OFFICE/OUTPT VISIT, EST, LEVL III, 20-29 MIN: ICD-10-PCS | Mod: S$PBB,,, | Performed by: FAMILY MEDICINE

## 2019-08-15 PROCEDURE — 99999 PR PBB SHADOW E&M-EST. PATIENT-LVL III: CPT | Mod: PBBFAC,,, | Performed by: FAMILY MEDICINE

## 2019-08-15 PROCEDURE — 99213 OFFICE O/P EST LOW 20 MIN: CPT | Mod: S$PBB,,, | Performed by: FAMILY MEDICINE

## 2019-08-15 PROCEDURE — 99213 OFFICE O/P EST LOW 20 MIN: CPT | Mod: PBBFAC | Performed by: FAMILY MEDICINE

## 2019-08-15 RX ORDER — DEXTROAMPHETAMINE SACCHARATE, AMPHETAMINE ASPARTATE MONOHYDRATE, DEXTROAMPHETAMINE SULFATE AND AMPHETAMINE SULFATE 7.5; 7.5; 7.5; 7.5 MG/1; MG/1; MG/1; MG/1
30 CAPSULE, EXTENDED RELEASE ORAL EVERY MORNING
Qty: 30 CAPSULE | Refills: 0 | Status: SHIPPED | OUTPATIENT
Start: 2019-08-15 | End: 2019-11-15 | Stop reason: SDUPTHER

## 2019-08-15 RX ORDER — DEXTROAMPHETAMINE SACCHARATE, AMPHETAMINE ASPARTATE MONOHYDRATE, DEXTROAMPHETAMINE SULFATE AND AMPHETAMINE SULFATE 7.5; 7.5; 7.5; 7.5 MG/1; MG/1; MG/1; MG/1
30 CAPSULE, EXTENDED RELEASE ORAL EVERY MORNING
Qty: 30 CAPSULE | Refills: 0 | Status: SHIPPED | OUTPATIENT
Start: 2019-10-15 | End: 2019-11-15 | Stop reason: SDUPTHER

## 2019-08-15 RX ORDER — DEXTROAMPHETAMINE SACCHARATE, AMPHETAMINE ASPARTATE MONOHYDRATE, DEXTROAMPHETAMINE SULFATE AND AMPHETAMINE SULFATE 7.5; 7.5; 7.5; 7.5 MG/1; MG/1; MG/1; MG/1
30 CAPSULE, EXTENDED RELEASE ORAL EVERY MORNING
Qty: 30 CAPSULE | Refills: 0 | Status: SHIPPED | OUTPATIENT
Start: 2019-09-15 | End: 2019-11-15 | Stop reason: SDUPTHER

## 2019-08-15 NOTE — PATIENT INSTRUCTIONS
Telemedicine Virtual Visits    What is a Virtual Visit?  A virtual visit is a secure video appointment with your provider via your smartphone or tablet.  This allows you to conduct a traditional office visit with your provider electronically through your [employer] agustín without leaving home or work.    How much is a Virtual Visit?  If you have health insurance, your insurance will be billed for the virtual visit. If your insurance does not cover a virtual visit (or if you do not have health insurance), you will be responsible for a $54 fee. If your insurance covers the virtual visit, you will be responsible for your co-pay, like when you come in for an office visit. (More and more insurances are covering virtual visits. If you have questions about your insurance coverage for virtual visits, you will need to contact your health insurance provider.)    Preparing for your Virtual Visit  · You must have a mobile phone or tablet with iOS or Android operating system.  · Your device must have a front-facing camera.  · You must have the [employer] agustín installed and [facility] selected as your healthcare provider.  · You can find the [employer] agustín in the [employer] Store ([employer]) and Google Play Store ([employer]).  · If you need help with [employer], help is available:  · online at [email or web address]   at the O-bar in the 1st floor lobby of [facility]  · or by phone at [phone]    E-Pre-Check  · Once your Virtual Visit is scheduled you will need to complete the ePre-Check in the [employer] agustín before your visit.   · During ePre-Check you will verify your insurance, demographics and sign the Telehealth Consent form.        How to Start Your Virtual Visit  Once your ePre-check is complete, you will need to test your hardware prior to your scheduled appointment.    1. Select the box that has your upcoming Virtual Visit appointment.  2.  On the next screen select the 'Test Video' button on the bottom of  "the screen.  3.  If successful, you will receive a pop-up saying "You're all set."          Ready for your Virtual Visit Appointment   · Select 'Appointments' from the [employer] home screen.  · Find and select today's Virtual Visit Appointment.  · Select 'Begin Visit'. Once selected you will enter a virtual visit waiting room until your provider arrives.       "

## 2019-08-15 NOTE — PROGRESS NOTES
CHIEF COMPLAINT  Medication Refill      HISTORY, ASSESSMENT, and PLAN    1. Attention deficit hyperactivity disorder (ADHD), predominantly inattentive type        Orders Placed This Encounter    dextroamphetamine-amphetamine (ADDERALL XR) 30 MG 24 hr capsule    dextroamphetamine-amphetamine (ADDERALL XR) 30 MG 24 hr capsule    dextroamphetamine-amphetamine (ADDERALL XR) 30 MG 24 hr capsule       Problem List Items Addressed This Visit        Psychiatric    Attention deficit hyperactivity disorder (ADHD), predominantly inattentive type - Primary    Current Assessment & Plan     She reports that her current treatment is providing satisfactory relief of her attention deficit disorder symptoms and helping her compensate for her deficits. She starts nursing school next week. She reports that she is tolerating her current treatment well. She reports no mood instability, tics, or disordered sleep, or other apparent adverse effects. She is demonstrating no behaviors to suggest inappropriate use of prescribed medications. [place] Board of Pharmacy Controlled Prescription Drug Monitoring database was queried and showed no activity to suggest abuse, diversion, or other inappropriate use of prescription medications.          Relevant Medications    dextroamphetamine-amphetamine (ADDERALL XR) 30 MG 24 hr capsule    dextroamphetamine-amphetamine (ADDERALL XR) 30 MG 24 hr capsule (Start on 10/15/2019)    dextroamphetamine-amphetamine (ADDERALL XR) 30 MG 24 hr capsule (Start on 9/15/2019)           Outpatient Medications Prior to Visit   Medication Sig Dispense Refill    dextroamphetamine-amphetamine (ADDERALL XR) 30 MG 24 hr capsule Take 1 capsule (30 mg total) by mouth every morning. 30 capsule 0    dextroamphetamine-amphetamine (ADDERALL XR) 30 MG 24 hr capsule Take 1 capsule (30 mg total) by mouth every morning. 30 capsule 0    dextroamphetamine-amphetamine (ADDERALL XR) 30 MG 24 hr capsule Take 1 capsule (30 mg total)  by mouth every morning. 30 capsule 0     No facility-administered medications prior to visit.         Medications Ordered This Encounter   Medications    dextroamphetamine-amphetamine (ADDERALL XR) 30 MG 24 hr capsule     Sig: Take 1 capsule (30 mg total) by mouth every morning.     Dispense:  30 capsule     Refill:  0           Order Specific Question:   This medication typically requires a prior authorization. For prior auth services, patient financial assistance, patient education and medication management send to an [facility] retail pharmacy.     Answer:   No, I will select a different retail pharmacy    dextroamphetamine-amphetamine (ADDERALL XR) 30 MG 24 hr capsule     Sig: Take 1 capsule (30 mg total) by mouth every morning.     Dispense:  30 capsule     Refill:  0           Order Specific Question:   This medication typically requires a prior authorization. For prior auth services, patient financial assistance, patient education and medication management send to an [facility] retail pharmacy.     Answer:   No, I will select a different retail pharmacy    dextroamphetamine-amphetamine (ADDERALL XR) 30 MG 24 hr capsule     Sig: Take 1 capsule (30 mg total) by mouth every morning.     Dispense:  30 capsule     Refill:  0           Order Specific Question:   This medication typically requires a prior authorization. For prior auth services, patient financial assistance, patient education and medication management send to an [facility] retail pharmacy.     Answer:   No, I will select a different retail pharmacy       Medications Discontinued During This Encounter   Medication Reason    dextroamphetamine-amphetamine (ADDERALL XR) 30 MG 24 hr capsule Reorder    dextroamphetamine-amphetamine (ADDERALL XR) 30 MG 24 hr capsule Reorder    dextroamphetamine-amphetamine (ADDERALL XR) 30 MG 24 hr capsule Reorder        Follow up in about 3 months (around 11/15/2019) for re-evaluate problem(s) discussed today.    REVIEW OF  "SYSTEMS  Answers for HPI/ROS submitted by the patient on 8/15/2019   activity change: No  unexpected weight change: No  neck pain: No  hearing loss: No  rhinorrhea: No  trouble swallowing: No  eye discharge: No  visual disturbance: No  chest tightness: No  wheezing: No  chest pain: No  palpitations: No  blood in stool: No  constipation: No  vomiting: No  diarrhea: No  polydipsia: No  polyuria: No  difficulty urinating: No  hematuria: No  menstrual problem: No  dysuria: No  joint swelling: No  arthralgias: No  headaches: No  weakness: No  confusion: No  dysphoric mood: No      PHYSICAL EXAM  Vitals:    08/15/19 1422   BP: 106/76   BP Location: Right arm   Patient Position: Sitting   Pulse: 97   Temp: 98.6 °F (37 °C)   TempSrc: Tympanic   SpO2: 99%   Weight: 77.7 kg (171 lb 4.8 oz)   Height: 5' 4.5" (1.638 m)     CONSTITUTIONAL: Vital signs noted. No apparent distress. Does not appear acutely ill or septic. Appears adequately hydrated.  PULM: Breathing unlabored.  PSYCHIATRIC: Alert and conversant and grossly oriented. Mood is grossly neutral. Affect appropriate. Judgment and insight not grossly compromised.     Documentation entered by me for this encounter may have been done in part using speech-recognition technology. Although I have made an effort to ensure accuracy, "sound like" errors may exist and should be interpreted in context. -DEVIN Estrada MD.   "

## 2019-08-15 NOTE — ASSESSMENT & PLAN NOTE
She reports that her current treatment is providing satisfactory relief of her attention deficit disorder symptoms and helping her compensate for her deficits. She starts nursing school next week. She reports that she is tolerating her current treatment well. She reports no mood instability, tics, or disordered sleep, or other apparent adverse effects. She is demonstrating no behaviors to suggest inappropriate use of prescribed medications. [place] Board  Pharmacy Controlled Prescription Drug Monitoring database was queried and showed no activity to suggest abuse, diversion, or other inappropriate use of prescription medications.

## 2019-11-15 ENCOUNTER — OFFICE VISIT (OUTPATIENT)
Dept: INTERNAL MEDICINE | Facility: CLINIC | Age: 29
End: 2019-11-15
Payer: MEDICAID

## 2019-11-15 VITALS
HEART RATE: 90 BPM | SYSTOLIC BLOOD PRESSURE: 110 MMHG | BODY MASS INDEX: 28.4 KG/M2 | TEMPERATURE: 98 F | OXYGEN SATURATION: 98 % | HEIGHT: 65 IN | DIASTOLIC BLOOD PRESSURE: 82 MMHG | WEIGHT: 170.44 LBS

## 2019-11-15 DIAGNOSIS — F90.0 ATTENTION DEFICIT HYPERACTIVITY DISORDER (ADHD), PREDOMINANTLY INATTENTIVE TYPE: ICD-10-CM

## 2019-11-15 PROCEDURE — 99213 PR OFFICE/OUTPT VISIT, EST, LEVL III, 20-29 MIN: ICD-10-PCS | Mod: S$PBB,,, | Performed by: FAMILY MEDICINE

## 2019-11-15 PROCEDURE — 99213 OFFICE O/P EST LOW 20 MIN: CPT | Mod: PBBFAC | Performed by: FAMILY MEDICINE

## 2019-11-15 PROCEDURE — 99213 OFFICE O/P EST LOW 20 MIN: CPT | Mod: S$PBB,,, | Performed by: FAMILY MEDICINE

## 2019-11-15 PROCEDURE — 99999 PR PBB SHADOW E&M-EST. PATIENT-LVL III: ICD-10-PCS | Mod: PBBFAC,,, | Performed by: FAMILY MEDICINE

## 2019-11-15 PROCEDURE — 99999 PR PBB SHADOW E&M-EST. PATIENT-LVL III: CPT | Mod: PBBFAC,,, | Performed by: FAMILY MEDICINE

## 2019-11-15 RX ORDER — DEXTROAMPHETAMINE SACCHARATE, AMPHETAMINE ASPARTATE MONOHYDRATE, DEXTROAMPHETAMINE SULFATE AND AMPHETAMINE SULFATE 7.5; 7.5; 7.5; 7.5 MG/1; MG/1; MG/1; MG/1
30 CAPSULE, EXTENDED RELEASE ORAL EVERY MORNING
Qty: 30 CAPSULE | Refills: 0 | Status: SHIPPED | OUTPATIENT
Start: 2020-01-15 | End: 2020-02-20 | Stop reason: SDUPTHER

## 2019-11-15 RX ORDER — DEXTROAMPHETAMINE SACCHARATE, AMPHETAMINE ASPARTATE MONOHYDRATE, DEXTROAMPHETAMINE SULFATE AND AMPHETAMINE SULFATE 7.5; 7.5; 7.5; 7.5 MG/1; MG/1; MG/1; MG/1
30 CAPSULE, EXTENDED RELEASE ORAL EVERY MORNING
Qty: 30 CAPSULE | Refills: 0 | Status: SHIPPED | OUTPATIENT
Start: 2019-11-15 | End: 2020-02-20 | Stop reason: SDUPTHER

## 2019-11-15 RX ORDER — DEXTROAMPHETAMINE SACCHARATE, AMPHETAMINE ASPARTATE MONOHYDRATE, DEXTROAMPHETAMINE SULFATE AND AMPHETAMINE SULFATE 7.5; 7.5; 7.5; 7.5 MG/1; MG/1; MG/1; MG/1
30 CAPSULE, EXTENDED RELEASE ORAL EVERY MORNING
Qty: 30 CAPSULE | Refills: 0 | Status: SHIPPED | OUTPATIENT
Start: 2019-12-15 | End: 2020-02-20 | Stop reason: SDUPTHER

## 2019-11-15 NOTE — PATIENT INSTRUCTIONS
Telemedicine Virtual Visits    What is a Virtual Visit?  A virtual visit is a secure video appointment with your provider via your smartphone or tablet.  This allows you to conduct a traditional office visit with your provider electronically through your [employer] agustín without leaving home or work.    How much is a Virtual Visit?  If you have health insurance, your insurance will be billed for the virtual visit. If your insurance does not cover a virtual visit (or if you do not have health insurance), you will be responsible for a $54 fee. If your insurance covers the virtual visit, you will be responsible for your co-pay, like when you come in for an office visit. (More and more insurances are covering virtual visits. If you have questions about your insurance coverage for virtual visits, you will need to contact your health insurance provider.)    Preparing for your Virtual Visit  · You must have a mobile phone or tablet with iOS or Android operating system.  · Your device must have a front-facing camera.  · You must have the [employer] agustín installed and [facility] selected as your healthcare provider.  · You can find the [employer] agustín in the [employer] Store ([employer]) and Google Play Store ([employer]).  · If you need help with [employer], help is available:  · online at [email or web address]   at the O-bar in the 1st floor lobby of [facility]  · or by phone at [phone]    E-Pre-Check  · Once your Virtual Visit is scheduled you will need to complete the ePre-Check in the [employer] agustín before your visit.   · During ePre-Check you will verify your insurance, demographics and sign the Telehealth Consent form.        How to Start Your Virtual Visit  Once your ePre-check is complete, you will need to test your hardware prior to your scheduled appointment.    1. Select the box that has your upcoming Virtual Visit appointment.  2.  On the next screen select the 'Test Video' button on the bottom of  "the screen.  3.  If successful, you will receive a pop-up saying "You're all set."          Ready for your Virtual Visit Appointment   · Select 'Appointments' from the [employer] home screen.  · Find and select today's Virtual Visit Appointment.  · Select 'Begin Visit'. Once selected you will enter a virtual visit waiting room until your provider arrives.       "

## 2019-11-15 NOTE — ASSESSMENT & PLAN NOTE
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 She reports that she is tolerating her current treatment well. She reports no mood instability, tics, or disordered sleep, or other apparent adverse effects. She is demonstrating no behaviors to suggest inappropriate use of prescribed medications. [place] Board of Pharmacy Controlled Prescription Drug Monitoring database was queried and showed no activity to suggest abuse, diversion, or other inappropriate use of prescription medications.

## 2019-11-15 NOTE — PROGRESS NOTES
CHIEF COMPLAINT  Follow-up      HISTORY, ASSESSMENT, and PLAN    1. Attention deficit hyperactivity disorder (ADHD), predominantly inattentive type        Orders Placed This Encounter    dextroamphetamine-amphetamine (ADDERALL XR) 30 MG 24 hr capsule    dextroamphetamine-amphetamine (ADDERALL XR) 30 MG 24 hr capsule    dextroamphetamine-amphetamine (ADDERALL XR) 30 MG 24 hr capsule       Problem List Items Addressed This Visit        Psychiatric    Attention deficit hyperactivity disorder (ADHD), predominantly inattentive type    Current Assessment & Plan     She reports that her current treatment is providing satisfactory relief of her attention deficit disorder symptoms and 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 She reports that she is tolerating her current treatment well. She reports no mood instability, tics, or disordered sleep, or other apparent adverse effects. She is demonstrating no behaviors to suggest inappropriate use of prescribed medications. [place] [employer] Controlled Prescription Drug Monitoring database was queried and showed no activity to suggest abuse, diversion, or other inappropriate use of prescription medications.          Relevant Medications    dextroamphetamine-amphetamine (ADDERALL XR) 30 MG 24 hr capsule (Start on 1/15/2020)    dextroamphetamine-amphetamine (ADDERALL XR) 30 MG 24 hr capsule (Start on 12/15/2019)    dextroamphetamine-amphetamine (ADDERALL XR) 30 MG 24 hr capsule           Outpatient Medications Prior to Visit   Medication Sig Dispense Refill    dextroamphetamine-amphetamine (ADDERALL XR) 30 MG 24 hr capsule Take 1 capsule (30 mg total) by mouth every morning. 30 capsule 0    dextroamphetamine-amphetamine (ADDERALL XR) 30 MG 24 hr capsule Take 1 capsule (30 mg total) by mouth every morning. 30 capsule 0    dextroamphetamine-amphetamine (ADDERALL XR) 30 MG 24 hr capsule Take 1 capsule (30 mg total) by mouth every morning. 30 capsule 0  "    No facility-administered medications prior to visit.         Medications Ordered This Encounter   Medications    dextroamphetamine-amphetamine (ADDERALL XR) 30 MG 24 hr capsule     Sig: Take 1 capsule (30 mg total) by mouth every morning.     Dispense:  30 capsule     Refill:  0    dextroamphetamine-amphetamine (ADDERALL XR) 30 MG 24 hr capsule     Sig: Take 1 capsule (30 mg total) by mouth every morning.     Dispense:  30 capsule     Refill:  0    dextroamphetamine-amphetamine (ADDERALL XR) 30 MG 24 hr capsule     Sig: Take 1 capsule (30 mg total) by mouth every morning.     Dispense:  30 capsule     Refill:  0       Medications Discontinued During This Encounter   Medication Reason    dextroamphetamine-amphetamine (ADDERALL XR) 30 MG 24 hr capsule Reorder    dextroamphetamine-amphetamine (ADDERALL XR) 30 MG 24 hr capsule Reorder    dextroamphetamine-amphetamine (ADDERALL XR) 30 MG 24 hr capsule Reorder        Follow up in about 3 months (around 2/15/2020) for re-evaluate problem(s) discussed today.    REVIEW OF SYSTEMS  Answers for HPI/ROS submitted by the patient on 11/10/2019   activity change: No  unexpected weight change: No  neck pain: No  hearing loss: No  rhinorrhea: No  trouble swallowing: No  eye discharge: No  visual disturbance: No  chest tightness: No  wheezing: No  chest pain: No  palpitations: No  blood in stool: No  constipation: No  vomiting: No  diarrhea: No  polydipsia: No  polyuria: No  difficulty urinating: No  hematuria: No  menstrual problem: No  dysuria: No  joint swelling: No  arthralgias: No  headaches: No  weakness: No  confusion: No  dysphoric mood: No      PHYSICAL EXAM  Vitals:    11/15/19 1400   BP: 110/82   Pulse: 90   Temp: 98 °F (36.7 °C)   TempSrc: Tympanic   SpO2: 98%   Weight: 77.3 kg (170 lb 6.7 oz)   Height: 5' 4.5" (1.638 m)     CONSTITUTIONAL: Vital signs noted. No apparent distress. Does not appear acutely ill or septic. Appears adequately hydrated.  ENT: " "Oropharynx moist.  PULM: Breathing unlabored.  CV: Heart regular.  DERM: Skin normothermic.  PSYCHIATRIC: Alert and conversant. Grossly oriented. Mood is grossly neutral. Affect appropriate. Judgment and insight not grossly compromised.     Documentation entered by me for this encounter may have been done in part using speech-recognition technology. Although I have made an effort to ensure accuracy, "sound like" errors may exist and should be interpreted in context. -DEVIN Estrada MD.   "

## 2020-02-20 ENCOUNTER — OFFICE VISIT (OUTPATIENT)
Dept: INTERNAL MEDICINE | Facility: CLINIC | Age: 30
End: 2020-02-20
Payer: MEDICAID

## 2020-02-20 VITALS
DIASTOLIC BLOOD PRESSURE: 82 MMHG | OXYGEN SATURATION: 99 % | WEIGHT: 174.81 LBS | BODY MASS INDEX: 29.12 KG/M2 | TEMPERATURE: 96 F | HEIGHT: 65 IN | HEART RATE: 80 BPM | SYSTOLIC BLOOD PRESSURE: 102 MMHG

## 2020-02-20 DIAGNOSIS — F90.0 ATTENTION DEFICIT HYPERACTIVITY DISORDER (ADHD), PREDOMINANTLY INATTENTIVE TYPE: ICD-10-CM

## 2020-02-20 PROCEDURE — 99213 OFFICE O/P EST LOW 20 MIN: CPT | Mod: PBBFAC | Performed by: FAMILY MEDICINE

## 2020-02-20 PROCEDURE — 99999 PR PBB SHADOW E&M-EST. PATIENT-LVL III: ICD-10-PCS | Mod: PBBFAC,,, | Performed by: FAMILY MEDICINE

## 2020-02-20 PROCEDURE — 99999 PR PBB SHADOW E&M-EST. PATIENT-LVL III: CPT | Mod: PBBFAC,,, | Performed by: FAMILY MEDICINE

## 2020-02-20 PROCEDURE — 99213 OFFICE O/P EST LOW 20 MIN: CPT | Mod: S$PBB,,, | Performed by: FAMILY MEDICINE

## 2020-02-20 PROCEDURE — 99213 PR OFFICE/OUTPT VISIT, EST, LEVL III, 20-29 MIN: ICD-10-PCS | Mod: S$PBB,,, | Performed by: FAMILY MEDICINE

## 2020-02-20 RX ORDER — DEXTROAMPHETAMINE SACCHARATE, AMPHETAMINE ASPARTATE MONOHYDRATE, DEXTROAMPHETAMINE SULFATE AND AMPHETAMINE SULFATE 7.5; 7.5; 7.5; 7.5 MG/1; MG/1; MG/1; MG/1
30 CAPSULE, EXTENDED RELEASE ORAL EVERY MORNING
Qty: 30 CAPSULE | Refills: 0 | Status: SHIPPED | OUTPATIENT
Start: 2020-03-20 | End: 2020-06-22 | Stop reason: SDUPTHER

## 2020-02-20 RX ORDER — DEXTROAMPHETAMINE SACCHARATE, AMPHETAMINE ASPARTATE MONOHYDRATE, DEXTROAMPHETAMINE SULFATE AND AMPHETAMINE SULFATE 7.5; 7.5; 7.5; 7.5 MG/1; MG/1; MG/1; MG/1
30 CAPSULE, EXTENDED RELEASE ORAL EVERY MORNING
Qty: 30 CAPSULE | Refills: 0 | Status: SHIPPED | OUTPATIENT
Start: 2020-02-20 | End: 2020-06-22 | Stop reason: SDUPTHER

## 2020-02-20 RX ORDER — DEXTROAMPHETAMINE SACCHARATE, AMPHETAMINE ASPARTATE MONOHYDRATE, DEXTROAMPHETAMINE SULFATE AND AMPHETAMINE SULFATE 7.5; 7.5; 7.5; 7.5 MG/1; MG/1; MG/1; MG/1
30 CAPSULE, EXTENDED RELEASE ORAL EVERY MORNING
Qty: 30 CAPSULE | Refills: 0 | Status: SHIPPED | OUTPATIENT
Start: 2020-04-20 | End: 2020-06-22 | Stop reason: SDUPTHER

## 2020-02-21 NOTE — PROGRESS NOTES
CHIEF COMPLAINT  Follow-up      DIAGNOSES, HISTORY, ASSESSMENT, AND PLAN    1. Attention deficit hyperactivity disorder (ADHD), predominantly inattentive type        Problem List Items Addressed This Visit        Psychiatric    Attention deficit hyperactivity disorder (ADHD), predominantly inattentive type    Current Assessment & Plan     Well Controlled. Stable. She appears to be tolerating her current medication well and reports preceiving no adverse side-effects. She is demonstrating no behaviors to suggest inappropriate use of prescribed medications. [place] [employer] Controlled Prescription Drug Monitoring database was queried and showed no activity to suggest abuse, diversion, or other inappropriate use of prescription medications.          Relevant Medications    dextroamphetamine-amphetamine (ADDERALL XR) 30 MG 24 hr capsule    dextroamphetamine-amphetamine (ADDERALL XR) 30 MG 24 hr capsule (Start on 3/20/2020)    dextroamphetamine-amphetamine (ADDERALL XR) 30 MG 24 hr capsule (Start on 4/20/2020)          MEDICATION MANAGMENT    MEDICATIONS SUMMARY: I am having Alfie McKenzie maintain her dextroamphetamine-amphetamine, dextroamphetamine-amphetamine, and dextroamphetamine-amphetamine.    Outpatient Medications Prior to Visit   Medication Sig Dispense Refill    dextroamphetamine-amphetamine (ADDERALL XR) 30 MG 24 hr capsule Take 1 capsule (30 mg total) by mouth every morning. 30 capsule 0    dextroamphetamine-amphetamine (ADDERALL XR) 30 MG 24 hr capsule Take 1 capsule (30 mg total) by mouth every morning. 30 capsule 0    dextroamphetamine-amphetamine (ADDERALL XR) 30 MG 24 hr capsule Take 1 capsule (30 mg total) by mouth every morning. 30 capsule 0     No facility-administered medications prior to visit.         Medications Discontinued During This Encounter   Medication Reason    dextroamphetamine-amphetamine (ADDERALL XR) 30 MG 24 hr capsule Reorder    dextroamphetamine-amphetamine (ADDERALL  XR) 30 MG 24 hr capsule Reorder    dextroamphetamine-amphetamine (ADDERALL XR) 30 MG 24 hr capsule Reorder        Medications Ordered This Encounter   Medications    dextroamphetamine-amphetamine (ADDERALL XR) 30 MG 24 hr capsule     Sig: Take 1 capsule (30 mg total) by mouth every morning.     Dispense:  30 capsule     Refill:  0     This prescription may be filled in a timely manner after completion of prior dispensed supply of this medication.     dextroamphetamine-amphetamine (ADDERALL XR) 30 MG 24 hr capsule     Sig: Take 1 capsule (30 mg total) by mouth every morning.     Dispense:  30 capsule     Refill:  0     This prescription may be filled in a timely manner after completion of prior dispensed supply of this medication.     dextroamphetamine-amphetamine (ADDERALL XR) 30 MG 24 hr capsule     Sig: Take 1 capsule (30 mg total) by mouth every morning.     Dispense:  30 capsule     Refill:  0     This prescription may be filled in a timely manner after completion of prior dispensed supply of this medication.        FOLLOW-UP  Follow up in about 3 months (around 5/20/2020) for re-evaluate problem(s) discussed today.     REVIEW OF SYSTEMS  Answers for HPI/ROS submitted by the patient on 2/19/2020    activity change: No  unexpected weight change: No  neck pain: No  hearing loss: No  rhinorrhea: No  trouble swallowing: No  eye discharge: No  visual disturbance: No  chest tightness: No  wheezing: No  chest pain: No  palpitations: No  blood in stool: No  constipation: No  vomiting: No  diarrhea: No  polydipsia: No  polyuria: No  difficulty urinating: No  hematuria: No  menstrual problem: No  dysuria: No  joint swelling: No  arthralgias: No  headaches: No  weakness: No  confusion: No  dysphoric mood: No    PHYSICAL EXAM  Vitals:    02/20/20 1451   BP: 102/82   BP Location: Left arm   Patient Position: Sitting   BP Method: Large (Manual)   Pulse: 80   Temp: 96 °F (35.6 °C)   TempSrc: Tympanic   SpO2: 99%   Weight:  "79.3 kg (174 lb 13.2 oz)   Height: 5' 4.5" (1.638 m)     CONSTITUTIONAL: Vital signs noted. No apparent distress. Does not appear acutely ill or septic. Appears adequately hydrated.  ENT: Oropharynx moist.  PULM: Breathing unlabored.  CV: Heart regular.  DERM: Skin normothermic.  PSYCHIATRIC: Alert and conversant. Grossly oriented. Mood is grossly neutral. Affect appropriate. Judgment and insight not grossly compromised.     Documentation entered by me for this encounter may have been done in part using speech-recognition technology. Although I have made an effort to ensure accuracy, "sound like" errors may exist and should be interpreted in context. -EDVIN Estrada MD.   "

## 2020-02-21 NOTE — ASSESSMENT & PLAN NOTE
Well Controlled. Stable. She appears to be tolerating her current medication well and reports preceiving no adverse side-effects. She is demonstrating no behaviors to suggest inappropriate use of prescribed medications. [place] [employer] Controlled Prescription Drug Monitoring database was queried and showed no activity to suggest abuse, diversion, or other inappropriate use of prescription medications.

## 2020-06-22 ENCOUNTER — OFFICE VISIT (OUTPATIENT)
Dept: INTERNAL MEDICINE | Facility: CLINIC | Age: 30
End: 2020-06-22
Payer: MEDICAID

## 2020-06-22 DIAGNOSIS — F90.0 ATTENTION DEFICIT HYPERACTIVITY DISORDER (ADHD), PREDOMINANTLY INATTENTIVE TYPE: ICD-10-CM

## 2020-06-22 PROCEDURE — 99213 OFFICE O/P EST LOW 20 MIN: CPT | Mod: 95,,, | Performed by: FAMILY MEDICINE

## 2020-06-22 PROCEDURE — 99213 PR OFFICE/OUTPT VISIT, EST, LEVL III, 20-29 MIN: ICD-10-PCS | Mod: 95,,, | Performed by: FAMILY MEDICINE

## 2020-06-22 RX ORDER — DEXTROAMPHETAMINE SACCHARATE, AMPHETAMINE ASPARTATE MONOHYDRATE, DEXTROAMPHETAMINE SULFATE AND AMPHETAMINE SULFATE 7.5; 7.5; 7.5; 7.5 MG/1; MG/1; MG/1; MG/1
30 CAPSULE, EXTENDED RELEASE ORAL EVERY MORNING
Qty: 30 CAPSULE | Refills: 0 | Status: SHIPPED | OUTPATIENT
Start: 2020-08-22 | End: 2020-09-21 | Stop reason: SDUPTHER

## 2020-06-22 RX ORDER — DEXTROAMPHETAMINE SACCHARATE, AMPHETAMINE ASPARTATE MONOHYDRATE, DEXTROAMPHETAMINE SULFATE AND AMPHETAMINE SULFATE 7.5; 7.5; 7.5; 7.5 MG/1; MG/1; MG/1; MG/1
30 CAPSULE, EXTENDED RELEASE ORAL EVERY MORNING
Qty: 30 CAPSULE | Refills: 0 | Status: SHIPPED | OUTPATIENT
Start: 2020-07-22 | End: 2020-09-21 | Stop reason: SDUPTHER

## 2020-06-22 RX ORDER — DEXTROAMPHETAMINE SACCHARATE, AMPHETAMINE ASPARTATE MONOHYDRATE, DEXTROAMPHETAMINE SULFATE AND AMPHETAMINE SULFATE 7.5; 7.5; 7.5; 7.5 MG/1; MG/1; MG/1; MG/1
30 CAPSULE, EXTENDED RELEASE ORAL EVERY MORNING
Qty: 30 CAPSULE | Refills: 0 | Status: SHIPPED | OUTPATIENT
Start: 2020-06-22 | End: 2020-09-21 | Stop reason: SDUPTHER

## 2020-06-22 NOTE — PROGRESS NOTES
TELEMEDICINE VIRTUAL VISIT  CHIEF COMPLAINT  Follow-up and Medication Refill      DIAGNOSES, HISTORY, ASSESSMENT, AND PLAN    1. Attention deficit hyperactivity disorder (ADHD), predominantly inattentive type        Problem List Items Addressed This Visit        Psychiatric    Attention deficit hyperactivity disorder (ADHD), predominantly inattentive type    Current Assessment & Plan     She reports that her current treatment is providing satisfactory relief of her attention deficit disorder symptoms and helping her compensate for her deficits. 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 She reports that she is tolerating her current treatment well. She reports no mood instability, tics, or disordered sleep, or other apparent adverse effects. She is demonstrating no behaviors to suggest inappropriate use of prescribed medications. [place] [employer] Controlled Prescription Drug Monitoring database was queried and showed no activity to suggest abuse, diversion, or other inappropriate use of prescription medications. Last in-office visit was February, 2020.         Relevant Medications    dextroamphetamine-amphetamine (ADDERALL XR) 30 MG 24 hr capsule    dextroamphetamine-amphetamine (ADDERALL XR) 30 MG 24 hr capsule (Start on 7/22/2020)    dextroamphetamine-amphetamine (ADDERALL XR) 30 MG 24 hr capsule (Start on 8/22/2020)           MEDICATION MANAGMENT    Outpatient Medications Prior to Visit   Medication Sig Dispense Refill    dextroamphetamine-amphetamine (ADDERALL XR) 30 MG 24 hr capsule Take 1 capsule (30 mg total) by mouth every morning. 30 capsule 0    dextroamphetamine-amphetamine (ADDERALL XR) 30 MG 24 hr capsule Take 1 capsule (30 mg total) by mouth every morning. 30 capsule 0    dextroamphetamine-amphetamine (ADDERALL XR) 30 MG 24 hr capsule Take 1 capsule (30 mg total) by mouth every morning. 30 capsule 0     No facility-administered medications  prior to visit.         Medications Discontinued During This Encounter   Medication Reason    dextroamphetamine-amphetamine (ADDERALL XR) 30 MG 24 hr capsule Reorder    dextroamphetamine-amphetamine (ADDERALL XR) 30 MG 24 hr capsule Reorder    dextroamphetamine-amphetamine (ADDERALL XR) 30 MG 24 hr capsule Reorder        Medications Ordered This Encounter   Medications    dextroamphetamine-amphetamine (ADDERALL XR) 30 MG 24 hr capsule     Sig: Take 1 capsule (30 mg total) by mouth every morning.     Dispense:  30 capsule     Refill:  0     This prescription may be filled in a timely manner after completion of prior dispensed supply of this medication.     dextroamphetamine-amphetamine (ADDERALL XR) 30 MG 24 hr capsule     Sig: Take 1 capsule (30 mg total) by mouth every morning.     Dispense:  30 capsule     Refill:  0     This prescription may be filled in a timely manner after completion of prior dispensed supply of this medication.     dextroamphetamine-amphetamine (ADDERALL XR) 30 MG 24 hr capsule     Sig: Take 1 capsule (30 mg total) by mouth every morning.     Dispense:  30 capsule     Refill:  0     This prescription may be filled in a timely manner after completion of prior dispensed supply of this medication.         FOLLOW-UP  Follow up in about 3 months (around 9/22/2020) for re-evaluate problem(s) discussed today.     REVIEW OF SYSTEMS  PSYCHIATRIC: No mood instability reported.  NEUROLOGIC: No seizures or tics reported.     PHYSICAL EXAM  CONSTITUTIONAL: No apparent distress. Appears comfortable. Does not appear acutely ill or septic. Appears adequately hydrated.  CARDIOVASCULAR: No perioral cyanosis.  PULMONARY: Breathing unlabored. No audible wheezes. Easily speaks in full sentences.  PSYCHIATRIC: Alert and conversant and grossly oriented. Mood is grossly neutral. Affect appropriate. Judgment and insight grossly intact.    Documentation entered by me for this encounter may have been done in part  "using speech-recognition technology. Although I have made an effort to ensure accuracy, "sound like" errors may exist and should be interpreted in context. -DEVIN Estrada MD.    Visit Details: This visit was a telemedicine virtual visit with synchronous audio and video. Alfie reported that her location at the time of this visit was in the [facility]. Alfie chose and consented to receive these medical services by telemedicine. TOTAL TIME evaluating and managing this patient for this encounter exceeded 15 minutes, the majority spent counseling and coordinating care for the listed diagnoses.    "

## 2020-06-22 NOTE — ASSESSMENT & PLAN NOTE
She reports that her current treatment is providing satisfactory relief of her attention deficit disorder symptoms and helping her compensate for her deficits. 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 She reports that she is tolerating her current treatment well. She reports no mood instability, tics, or disordered sleep, or other apparent adverse effects. She is demonstrating no behaviors to suggest inappropriate use of prescribed medications. [place] [employer] Controlled Prescription Drug Monitoring database was queried and showed no activity to suggest abuse, diversion, or other inappropriate use of prescription medications. Last in-office visit was February, 2020.

## 2020-09-21 ENCOUNTER — OFFICE VISIT (OUTPATIENT)
Dept: INTERNAL MEDICINE | Facility: CLINIC | Age: 30
End: 2020-09-21
Payer: MEDICAID

## 2020-09-21 ENCOUNTER — TELEPHONE (OUTPATIENT)
Dept: INTERNAL MEDICINE | Facility: CLINIC | Age: 30
End: 2020-09-21

## 2020-09-21 DIAGNOSIS — F90.0 ATTENTION DEFICIT HYPERACTIVITY DISORDER (ADHD), PREDOMINANTLY INATTENTIVE TYPE: ICD-10-CM

## 2020-09-21 PROCEDURE — 99213 PR OFFICE/OUTPT VISIT, EST, LEVL III, 20-29 MIN: ICD-10-PCS | Mod: 95,,, | Performed by: FAMILY MEDICINE

## 2020-09-21 PROCEDURE — 99213 OFFICE O/P EST LOW 20 MIN: CPT | Mod: 95,,, | Performed by: FAMILY MEDICINE

## 2020-09-21 RX ORDER — DEXTROAMPHETAMINE SACCHARATE, AMPHETAMINE ASPARTATE MONOHYDRATE, DEXTROAMPHETAMINE SULFATE AND AMPHETAMINE SULFATE 7.5; 7.5; 7.5; 7.5 MG/1; MG/1; MG/1; MG/1
30 CAPSULE, EXTENDED RELEASE ORAL EVERY MORNING
Qty: 30 CAPSULE | Refills: 0 | Status: SHIPPED | OUTPATIENT
Start: 2020-11-21 | End: 2021-03-04 | Stop reason: SDUPTHER

## 2020-09-21 RX ORDER — DEXTROAMPHETAMINE SACCHARATE, AMPHETAMINE ASPARTATE MONOHYDRATE, DEXTROAMPHETAMINE SULFATE AND AMPHETAMINE SULFATE 7.5; 7.5; 7.5; 7.5 MG/1; MG/1; MG/1; MG/1
30 CAPSULE, EXTENDED RELEASE ORAL EVERY MORNING
Qty: 30 CAPSULE | Refills: 0 | Status: SHIPPED | OUTPATIENT
Start: 2020-09-21 | End: 2021-03-04 | Stop reason: SDUPTHER

## 2020-09-21 RX ORDER — DEXTROAMPHETAMINE SACCHARATE, AMPHETAMINE ASPARTATE MONOHYDRATE, DEXTROAMPHETAMINE SULFATE AND AMPHETAMINE SULFATE 7.5; 7.5; 7.5; 7.5 MG/1; MG/1; MG/1; MG/1
30 CAPSULE, EXTENDED RELEASE ORAL EVERY MORNING
Qty: 30 CAPSULE | Refills: 0 | Status: SHIPPED | OUTPATIENT
Start: 2020-10-21 | End: 2021-03-04 | Stop reason: SDUPTHER

## 2020-09-21 NOTE — Clinical Note
Please call her to schedule f/u appointment in mid-December. (In-office preferred, but telemedicine OK.) Schedule override approved. Thanks.

## 2020-09-21 NOTE — ASSESSMENT & PLAN NOTE
She reports that her current treatment is providing satisfactory relief of her attention deficit disorder symptoms and helping her compensate for her deficits in school and at work. She reports that she is tolerating her current treatment well. She reports no mood instability, tics, or disordered sleep, or other apparent adverse effects. She is demonstrating no behaviors to suggest inappropriate use of prescribed medications. [place] [employer] Controlled Prescription Drug Monitoring database was queried and showed no activity to suggest abuse, diversion, or other inappropriate use of prescription medications.

## 2020-09-21 NOTE — PROGRESS NOTES
TELEMEDICINE VIRTUAL VISIT    CHIEF COMPLAINT  No chief complaint on file.      DIAGNOSES, HISTORY, ASSESSMENT, AND PLAN  Assessment   1. Attention deficit hyperactivity disorder (ADHD), predominantly inattentive type         Orders Placed This Encounter    dextroamphetamine-amphetamine (ADDERALL XR) 30 MG 24 hr capsule    dextroamphetamine-amphetamine (ADDERALL XR) 30 MG 24 hr capsule    dextroamphetamine-amphetamine (ADDERALL XR) 30 MG 24 hr capsule       Except as noted herein, any chronic conditions are compensated/controlled and stable, and no other significant new complaints or concerns reported.     Plan   Problem List Items Addressed This Visit        Psychiatric    Attention deficit hyperactivity disorder (ADHD), predominantly inattentive type    Current Assessment & Plan     She reports that her current treatment is providing satisfactory relief of her attention deficit disorder symptoms and helping her compensate for her deficits in school and at work. She reports that she is tolerating her current treatment well. She reports no mood instability, tics, or disordered sleep, or other apparent adverse effects. She is demonstrating no behaviors to suggest inappropriate use of prescribed medications. [place] [employer] Controlled Prescription Drug Monitoring database was queried and showed no activity to suggest abuse, diversion, or other inappropriate use of prescription medications.          Relevant Medications    dextroamphetamine-amphetamine (ADDERALL XR) 30 MG 24 hr capsule    dextroamphetamine-amphetamine (ADDERALL XR) 30 MG 24 hr capsule (Start on 10/21/2020)    dextroamphetamine-amphetamine (ADDERALL XR) 30 MG 24 hr capsule (Start on 11/21/2020)          Outpatient Medications Prior to Visit   Medication Sig Dispense Refill    dextroamphetamine-amphetamine (ADDERALL XR) 30 MG 24 hr capsule Take 1 capsule (30 mg total) by mouth every morning. 30 capsule 0    dextroamphetamine-amphetamine  (ADDERALL XR) 30 MG 24 hr capsule Take 1 capsule (30 mg total) by mouth every morning. 30 capsule 0    dextroamphetamine-amphetamine (ADDERALL XR) 30 MG 24 hr capsule Take 1 capsule (30 mg total) by mouth every morning. 30 capsule 0     No facility-administered medications prior to visit.         Medications Discontinued During This Encounter   Medication Reason    dextroamphetamine-amphetamine (ADDERALL XR) 30 MG 24 hr capsule Reorder    dextroamphetamine-amphetamine (ADDERALL XR) 30 MG 24 hr capsule Reorder    dextroamphetamine-amphetamine (ADDERALL XR) 30 MG 24 hr capsule Reorder        Medications Ordered This Encounter   Medications    dextroamphetamine-amphetamine (ADDERALL XR) 30 MG 24 hr capsule     Sig: Take 1 capsule (30 mg total) by mouth every morning.     Dispense:  30 capsule     Refill:  0     .    dextroamphetamine-amphetamine (ADDERALL XR) 30 MG 24 hr capsule     Sig: Take 1 capsule (30 mg total) by mouth every morning.     Dispense:  30 capsule     Refill:  0     .    dextroamphetamine-amphetamine (ADDERALL XR) 30 MG 24 hr capsule     Sig: Take 1 capsule (30 mg total) by mouth every morning.     Dispense:  30 capsule     Refill:  0     .       Subjective   Review of Systems   Constitutional: Negative for activity change and unexpected weight change.   HENT: Negative for hearing loss, rhinorrhea and trouble swallowing.    Eyes: Negative for discharge and visual disturbance.   Respiratory: Negative for chest tightness and wheezing.    Cardiovascular: Negative for chest pain and palpitations.   Gastrointestinal: Negative for blood in stool, constipation, diarrhea and vomiting.   Endocrine: Negative for polydipsia and polyuria.   Genitourinary: Negative for difficulty urinating, dysuria, hematuria and menstrual problem.   Musculoskeletal: Negative for arthralgias, joint swelling and neck pain.   Neurological: Negative for weakness and headaches.   Psychiatric/Behavioral: Negative for confusion  "and dysphoric mood.        Objective   CONSTITUTIONAL: No apparent distress. Appears comfortable. Does not appear acutely ill or septic. Appears adequately hydrated.  CARDIOVASCULAR: No perioral cyanosis.  PULMONARY: Breathing unlabored. No audible wheezes. Easily speaks in full sentences.  PSYCHIATRIC: Alert and conversant and grossly oriented. Mood is grossly neutral. Affect appropriate. Judgment and insight grossly intact.     Documentation entered by me for this encounter may have been done in part using speech-recognition technology. Although I have made an effort to ensure accuracy, "sound like" errors may exist and should be interpreted in context. -DEVIN Estrada MD.    Visit Details: This visit was a telemedicine virtual visit with synchronous audio and video. Alfie represented that her location at the time of this visit was in the [facility]. Alfie chose and consented to receive these medical services by telemedicine. TOTAL TIME evaluating and managing this patient for this encounter exceeded 10 minutes, the majority spent counseling and coordinating care for the listed diagnoses.     "

## 2020-09-21 NOTE — TELEPHONE ENCOUNTER
Called and left a voicemail for patient to call back tomorrow and schedule her follow up with Dr. Estrada in mid December.

## 2020-09-21 NOTE — PATIENT INSTRUCTIONS
"Alfie Abad.    It was nice seeing you during your virtual visit. Please review your After Visit Summary from your [facility]. (Just click on "Appointments," choose the appropriate past appointment, and click on "View After Visit Summary.")    I'm asking my staff to contact you to schedule your next visit (in-office or telemedicine) in about 3 months. I look forward to seeing you at your next appointment.    Thanks for letting me care for you, and thanks for trusting [facility] with your healthcare needs.    Sincerely,    DEVIN Estrada MD     Medications Ordered This Encounter   Medications    dextroamphetamine-amphetamine (ADDERALL XR) 30 MG 24 hr capsule     Sig: Take 1 capsule (30 mg total) by mouth every morning.     Dispense:  30 capsule     Refill:  0     .    dextroamphetamine-amphetamine (ADDERALL XR) 30 MG 24 hr capsule     Sig: Take 1 capsule (30 mg total) by mouth every morning.     Dispense:  30 capsule     Refill:  0     .    dextroamphetamine-amphetamine (ADDERALL XR) 30 MG 24 hr capsule     Sig: Take 1 capsule (30 mg total) by mouth every morning.     Dispense:  30 capsule     Refill:  0     .     "

## 2021-02-22 ENCOUNTER — TELEPHONE (OUTPATIENT)
Dept: INTERNAL MEDICINE | Facility: CLINIC | Age: 31
End: 2021-02-22

## 2021-03-04 ENCOUNTER — OFFICE VISIT (OUTPATIENT)
Dept: INTERNAL MEDICINE | Facility: CLINIC | Age: 31
End: 2021-03-04
Payer: MEDICAID

## 2021-03-04 ENCOUNTER — LAB VISIT (OUTPATIENT)
Dept: LAB | Facility: HOSPITAL | Age: 31
End: 2021-03-04
Attending: FAMILY MEDICINE
Payer: MEDICAID

## 2021-03-04 VITALS
HEART RATE: 95 BPM | WEIGHT: 179.69 LBS | DIASTOLIC BLOOD PRESSURE: 70 MMHG | SYSTOLIC BLOOD PRESSURE: 120 MMHG | OXYGEN SATURATION: 98 % | HEIGHT: 65 IN | BODY MASS INDEX: 29.94 KG/M2 | TEMPERATURE: 98 F

## 2021-03-04 DIAGNOSIS — Z11.4 SCREENING FOR HIV WITHOUT PRESENCE OF RISK FACTORS: ICD-10-CM

## 2021-03-04 DIAGNOSIS — Z13.1 SCREENING FOR DIABETES MELLITUS: ICD-10-CM

## 2021-03-04 DIAGNOSIS — Z13.220 SCREENING FOR LIPID DISORDERS: ICD-10-CM

## 2021-03-04 DIAGNOSIS — Z11.59 ENCOUNTER FOR HEPATITIS C SCREENING TEST FOR LOW RISK PATIENT: ICD-10-CM

## 2021-03-04 DIAGNOSIS — Z00.00 PREVENTATIVE HEALTH CARE: ICD-10-CM

## 2021-03-04 DIAGNOSIS — F90.0 ATTENTION DEFICIT HYPERACTIVITY DISORDER (ADHD), PREDOMINANTLY INATTENTIVE TYPE: ICD-10-CM

## 2021-03-04 DIAGNOSIS — Z00.00 PREVENTATIVE HEALTH CARE: Primary | ICD-10-CM

## 2021-03-04 PROCEDURE — 82947 ASSAY GLUCOSE BLOOD QUANT: CPT | Performed by: FAMILY MEDICINE

## 2021-03-04 PROCEDURE — 99999 PR PBB SHADOW E&M-EST. PATIENT-LVL III: CPT | Mod: PBBFAC,,, | Performed by: FAMILY MEDICINE

## 2021-03-04 PROCEDURE — 36415 COLL VENOUS BLD VENIPUNCTURE: CPT | Performed by: FAMILY MEDICINE

## 2021-03-04 PROCEDURE — 99999 PR PBB SHADOW E&M-EST. PATIENT-LVL III: ICD-10-PCS | Mod: PBBFAC,,, | Performed by: FAMILY MEDICINE

## 2021-03-04 PROCEDURE — 99395 PR PREVENTIVE VISIT,EST,18-39: ICD-10-PCS | Mod: S$PBB,,, | Performed by: FAMILY MEDICINE

## 2021-03-04 PROCEDURE — 80061 LIPID PANEL: CPT | Performed by: FAMILY MEDICINE

## 2021-03-04 PROCEDURE — 99213 OFFICE O/P EST LOW 20 MIN: CPT | Mod: PBBFAC | Performed by: FAMILY MEDICINE

## 2021-03-04 PROCEDURE — 86703 HIV-1/HIV-2 1 RESULT ANTBDY: CPT | Performed by: FAMILY MEDICINE

## 2021-03-04 PROCEDURE — 86803 HEPATITIS C AB TEST: CPT | Performed by: FAMILY MEDICINE

## 2021-03-04 PROCEDURE — 99395 PREV VISIT EST AGE 18-39: CPT | Mod: S$PBB,,, | Performed by: FAMILY MEDICINE

## 2021-03-04 RX ORDER — DEXTROAMPHETAMINE SACCHARATE, AMPHETAMINE ASPARTATE MONOHYDRATE, DEXTROAMPHETAMINE SULFATE AND AMPHETAMINE SULFATE 7.5; 7.5; 7.5; 7.5 MG/1; MG/1; MG/1; MG/1
30 CAPSULE, EXTENDED RELEASE ORAL EVERY MORNING
Qty: 30 CAPSULE | Refills: 0 | Status: SHIPPED | OUTPATIENT
Start: 2021-03-04 | End: 2021-03-04 | Stop reason: SDUPTHER

## 2021-03-04 RX ORDER — DEXTROAMPHETAMINE SACCHARATE, AMPHETAMINE ASPARTATE MONOHYDRATE, DEXTROAMPHETAMINE SULFATE AND AMPHETAMINE SULFATE 7.5; 7.5; 7.5; 7.5 MG/1; MG/1; MG/1; MG/1
30 CAPSULE, EXTENDED RELEASE ORAL EVERY MORNING
Qty: 30 CAPSULE | Refills: 0 | Status: SHIPPED | OUTPATIENT
Start: 2021-03-04 | End: 2021-06-04 | Stop reason: SDUPTHER

## 2021-03-04 RX ORDER — DEXTROAMPHETAMINE SACCHARATE, AMPHETAMINE ASPARTATE MONOHYDRATE, DEXTROAMPHETAMINE SULFATE AND AMPHETAMINE SULFATE 7.5; 7.5; 7.5; 7.5 MG/1; MG/1; MG/1; MG/1
30 CAPSULE, EXTENDED RELEASE ORAL EVERY MORNING
Qty: 30 CAPSULE | Refills: 0 | Status: SHIPPED | OUTPATIENT
Start: 2021-04-04 | End: 2021-03-04 | Stop reason: SDUPTHER

## 2021-03-04 RX ORDER — DEXTROAMPHETAMINE SACCHARATE, AMPHETAMINE ASPARTATE MONOHYDRATE, DEXTROAMPHETAMINE SULFATE AND AMPHETAMINE SULFATE 7.5; 7.5; 7.5; 7.5 MG/1; MG/1; MG/1; MG/1
30 CAPSULE, EXTENDED RELEASE ORAL EVERY MORNING
Qty: 30 CAPSULE | Refills: 0 | Status: SHIPPED | OUTPATIENT
Start: 2021-05-04 | End: 2021-06-04 | Stop reason: SDUPTHER

## 2021-03-04 RX ORDER — DEXTROAMPHETAMINE SACCHARATE, AMPHETAMINE ASPARTATE MONOHYDRATE, DEXTROAMPHETAMINE SULFATE AND AMPHETAMINE SULFATE 7.5; 7.5; 7.5; 7.5 MG/1; MG/1; MG/1; MG/1
30 CAPSULE, EXTENDED RELEASE ORAL EVERY MORNING
Qty: 30 CAPSULE | Refills: 0 | Status: SHIPPED | OUTPATIENT
Start: 2021-05-04 | End: 2021-03-04 | Stop reason: SDUPTHER

## 2021-03-04 RX ORDER — DEXTROAMPHETAMINE SACCHARATE, AMPHETAMINE ASPARTATE MONOHYDRATE, DEXTROAMPHETAMINE SULFATE AND AMPHETAMINE SULFATE 7.5; 7.5; 7.5; 7.5 MG/1; MG/1; MG/1; MG/1
30 CAPSULE, EXTENDED RELEASE ORAL EVERY MORNING
Qty: 30 CAPSULE | Refills: 0 | Status: SHIPPED | OUTPATIENT
Start: 2021-04-04 | End: 2021-06-04 | Stop reason: SDUPTHER

## 2021-03-05 LAB
CHOLEST SERPL-MCNC: 134 MG/DL (ref 120–199)
CHOLEST/HDLC SERPL: 2.4 {RATIO} (ref 2–5)
GLUCOSE SERPL-MCNC: 76 MG/DL (ref 70–110)
HDLC SERPL-MCNC: 56 MG/DL (ref 40–75)
HDLC SERPL: 41.8 % (ref 20–50)
HIV 1+2 AB+HIV1 P24 AG SERPL QL IA: NEGATIVE
LDLC SERPL CALC-MCNC: 65.6 MG/DL (ref 63–159)
NONHDLC SERPL-MCNC: 78 MG/DL
TRIGL SERPL-MCNC: 62 MG/DL (ref 30–150)

## 2021-03-08 LAB — HCV AB SERPL QL IA: NEGATIVE

## 2021-06-04 ENCOUNTER — OFFICE VISIT (OUTPATIENT)
Dept: INTERNAL MEDICINE | Facility: CLINIC | Age: 31
End: 2021-06-04
Payer: MEDICAID

## 2021-06-04 VITALS
OXYGEN SATURATION: 98 % | TEMPERATURE: 97 F | HEART RATE: 94 BPM | HEIGHT: 64 IN | WEIGHT: 177.25 LBS | BODY MASS INDEX: 30.26 KG/M2 | SYSTOLIC BLOOD PRESSURE: 122 MMHG | DIASTOLIC BLOOD PRESSURE: 80 MMHG

## 2021-06-04 DIAGNOSIS — F90.0 ATTENTION DEFICIT HYPERACTIVITY DISORDER (ADHD), PREDOMINANTLY INATTENTIVE TYPE: ICD-10-CM

## 2021-06-04 PROCEDURE — 99999 PR PBB SHADOW E&M-EST. PATIENT-LVL III: ICD-10-PCS | Mod: PBBFAC,,, | Performed by: FAMILY MEDICINE

## 2021-06-04 PROCEDURE — 99213 OFFICE O/P EST LOW 20 MIN: CPT | Mod: S$PBB,,, | Performed by: FAMILY MEDICINE

## 2021-06-04 PROCEDURE — 99213 OFFICE O/P EST LOW 20 MIN: CPT | Mod: PBBFAC | Performed by: FAMILY MEDICINE

## 2021-06-04 PROCEDURE — 99999 PR PBB SHADOW E&M-EST. PATIENT-LVL III: CPT | Mod: PBBFAC,,, | Performed by: FAMILY MEDICINE

## 2021-06-04 PROCEDURE — 99213 PR OFFICE/OUTPT VISIT, EST, LEVL III, 20-29 MIN: ICD-10-PCS | Mod: S$PBB,,, | Performed by: FAMILY MEDICINE

## 2021-06-04 RX ORDER — DEXTROAMPHETAMINE SACCHARATE, AMPHETAMINE ASPARTATE MONOHYDRATE, DEXTROAMPHETAMINE SULFATE AND AMPHETAMINE SULFATE 7.5; 7.5; 7.5; 7.5 MG/1; MG/1; MG/1; MG/1
30 CAPSULE, EXTENDED RELEASE ORAL EVERY MORNING
Qty: 30 CAPSULE | Refills: 0 | Status: SHIPPED | OUTPATIENT
Start: 2021-07-04 | End: 2022-01-06 | Stop reason: SDUPTHER

## 2021-06-04 RX ORDER — DEXTROAMPHETAMINE SACCHARATE, AMPHETAMINE ASPARTATE MONOHYDRATE, DEXTROAMPHETAMINE SULFATE AND AMPHETAMINE SULFATE 7.5; 7.5; 7.5; 7.5 MG/1; MG/1; MG/1; MG/1
30 CAPSULE, EXTENDED RELEASE ORAL EVERY MORNING
Qty: 30 CAPSULE | Refills: 0 | Status: SHIPPED | OUTPATIENT
Start: 2021-06-04 | End: 2021-09-13 | Stop reason: SDUPTHER

## 2021-06-04 RX ORDER — DEXTROAMPHETAMINE SACCHARATE, AMPHETAMINE ASPARTATE MONOHYDRATE, DEXTROAMPHETAMINE SULFATE AND AMPHETAMINE SULFATE 7.5; 7.5; 7.5; 7.5 MG/1; MG/1; MG/1; MG/1
30 CAPSULE, EXTENDED RELEASE ORAL EVERY MORNING
Qty: 30 CAPSULE | Refills: 0 | Status: SHIPPED | OUTPATIENT
Start: 2021-08-04 | End: 2022-01-06 | Stop reason: SDUPTHER

## 2021-09-13 ENCOUNTER — OFFICE VISIT (OUTPATIENT)
Dept: INTERNAL MEDICINE | Facility: CLINIC | Age: 31
End: 2021-09-13
Payer: MEDICAID

## 2021-09-13 DIAGNOSIS — F90.0 ATTENTION DEFICIT HYPERACTIVITY DISORDER (ADHD), PREDOMINANTLY INATTENTIVE TYPE: ICD-10-CM

## 2021-09-13 PROCEDURE — 99213 OFFICE O/P EST LOW 20 MIN: CPT | Mod: 95,,, | Performed by: PHYSICIAN ASSISTANT

## 2021-09-13 PROCEDURE — 99213 PR OFFICE/OUTPT VISIT, EST, LEVL III, 20-29 MIN: ICD-10-PCS | Mod: 95,,, | Performed by: PHYSICIAN ASSISTANT

## 2021-09-13 RX ORDER — DEXTROAMPHETAMINE SACCHARATE, AMPHETAMINE ASPARTATE MONOHYDRATE, DEXTROAMPHETAMINE SULFATE AND AMPHETAMINE SULFATE 7.5; 7.5; 7.5; 7.5 MG/1; MG/1; MG/1; MG/1
30 CAPSULE, EXTENDED RELEASE ORAL EVERY MORNING
Qty: 30 CAPSULE | Refills: 0 | Status: SHIPPED | OUTPATIENT
Start: 2021-11-13 | End: 2022-01-06 | Stop reason: SDUPTHER

## 2021-09-13 RX ORDER — DEXTROAMPHETAMINE SACCHARATE, AMPHETAMINE ASPARTATE MONOHYDRATE, DEXTROAMPHETAMINE SULFATE AND AMPHETAMINE SULFATE 7.5; 7.5; 7.5; 7.5 MG/1; MG/1; MG/1; MG/1
30 CAPSULE, EXTENDED RELEASE ORAL EVERY MORNING
Qty: 30 CAPSULE | Refills: 0 | Status: SHIPPED | OUTPATIENT
Start: 2021-09-13 | End: 2021-10-13

## 2021-09-13 RX ORDER — DEXTROAMPHETAMINE SACCHARATE, AMPHETAMINE ASPARTATE MONOHYDRATE, DEXTROAMPHETAMINE SULFATE AND AMPHETAMINE SULFATE 7.5; 7.5; 7.5; 7.5 MG/1; MG/1; MG/1; MG/1
30 CAPSULE, EXTENDED RELEASE ORAL EVERY MORNING
Qty: 30 CAPSULE | Refills: 0 | Status: SHIPPED | OUTPATIENT
Start: 2021-10-13 | End: 2022-01-06 | Stop reason: SDUPTHER

## 2022-01-06 ENCOUNTER — PATIENT MESSAGE (OUTPATIENT)
Dept: INTERNAL MEDICINE | Facility: CLINIC | Age: 32
End: 2022-01-06

## 2022-01-06 ENCOUNTER — OFFICE VISIT (OUTPATIENT)
Dept: INTERNAL MEDICINE | Facility: CLINIC | Age: 32
End: 2022-01-06
Payer: MEDICAID

## 2022-01-06 DIAGNOSIS — F90.0 ATTENTION DEFICIT HYPERACTIVITY DISORDER (ADHD), PREDOMINANTLY INATTENTIVE TYPE: Primary | ICD-10-CM

## 2022-01-06 DIAGNOSIS — Z23 NEED FOR COVID-19 VACCINE: ICD-10-CM

## 2022-01-06 PROCEDURE — 99213 OFFICE O/P EST LOW 20 MIN: CPT | Mod: 95,,, | Performed by: FAMILY MEDICINE

## 2022-01-06 PROCEDURE — 99213 PR OFFICE/OUTPT VISIT, EST, LEVL III, 20-29 MIN: ICD-10-PCS | Mod: 95,,, | Performed by: FAMILY MEDICINE

## 2022-01-06 PROCEDURE — 1159F MED LIST DOCD IN RCRD: CPT | Mod: CPTII,95,, | Performed by: FAMILY MEDICINE

## 2022-01-06 PROCEDURE — 1160F PR REVIEW ALL MEDS BY PRESCRIBER/CLIN PHARMACIST DOCUMENTED: ICD-10-PCS | Mod: CPTII,95,, | Performed by: FAMILY MEDICINE

## 2022-01-06 PROCEDURE — 1159F PR MEDICATION LIST DOCUMENTED IN MEDICAL RECORD: ICD-10-PCS | Mod: CPTII,95,, | Performed by: FAMILY MEDICINE

## 2022-01-06 PROCEDURE — 1160F RVW MEDS BY RX/DR IN RCRD: CPT | Mod: CPTII,95,, | Performed by: FAMILY MEDICINE

## 2022-01-06 RX ORDER — DEXTROAMPHETAMINE SACCHARATE, AMPHETAMINE ASPARTATE MONOHYDRATE, DEXTROAMPHETAMINE SULFATE AND AMPHETAMINE SULFATE 7.5; 7.5; 7.5; 7.5 MG/1; MG/1; MG/1; MG/1
30 CAPSULE, EXTENDED RELEASE ORAL EVERY MORNING
Qty: 30 CAPSULE | Refills: 0 | Status: SHIPPED | OUTPATIENT
Start: 2022-01-06 | End: 2022-05-12 | Stop reason: SDUPTHER

## 2022-01-06 RX ORDER — DEXTROAMPHETAMINE SACCHARATE, AMPHETAMINE ASPARTATE MONOHYDRATE, DEXTROAMPHETAMINE SULFATE AND AMPHETAMINE SULFATE 7.5; 7.5; 7.5; 7.5 MG/1; MG/1; MG/1; MG/1
30 CAPSULE, EXTENDED RELEASE ORAL EVERY MORNING
Qty: 30 CAPSULE | Refills: 0 | Status: SHIPPED | OUTPATIENT
Start: 2022-03-06 | End: 2022-05-12 | Stop reason: SDUPTHER

## 2022-01-06 RX ORDER — DEXTROAMPHETAMINE SACCHARATE, AMPHETAMINE ASPARTATE MONOHYDRATE, DEXTROAMPHETAMINE SULFATE AND AMPHETAMINE SULFATE 7.5; 7.5; 7.5; 7.5 MG/1; MG/1; MG/1; MG/1
30 CAPSULE, EXTENDED RELEASE ORAL EVERY MORNING
Qty: 30 CAPSULE | Refills: 0 | Status: SHIPPED | OUTPATIENT
Start: 2022-02-06 | End: 2022-05-12 | Stop reason: SDUPTHER

## 2022-01-06 NOTE — ASSESSMENT & PLAN NOTE
She reports that her current treatment is providing satisfactory relief of her attention deficit disorder symptoms and helping her compensate for her deficits and improve her level of functioning. She reports that she is tolerating her current treatment well. She reports no mood instability, tics, disordered sleep, rapid heart rate, palpitations or other apparent adverse effects. She is demonstrating no behaviors to suggest inappropriate use of prescribed medications. [place] Board [employer] Pharmacy Controlled Prescription Drug Monitoring database was queried and showed no activity to suggest abuse, diversion, or other inappropriate use of prescription medications.

## 2022-01-06 NOTE — PROGRESS NOTES
TELEMEDICINE VIRTUAL VISIT    Visit Details: This visit was a telemedicine virtual visit with synchronous audio and video. Alfie represented that her location at the time of this visit was in the [facility]. Alfie chose and consented to receive these medical services by telemedicine.    CHIEF COMPLAINT: Follow-up and Medication Refill    DIAGNOSES SPECIFICALLY EVALUATED AND TREATED THIS ENCOUNTER  1. Attention deficit hyperactivity disorder (ADHD), predominantly inattentive type  - dextroamphetamine-amphetamine (ADDERALL XR) 30 MG 24 hr capsule; Take 1 capsule (30 mg total) by mouth every morning.  Dispense: 30 capsule; Refill: 0  - dextroamphetamine-amphetamine (ADDERALL XR) 30 MG 24 hr capsule; Take 1 capsule (30 mg total) by mouth every morning.  Dispense: 30 capsule; Refill: 0  - dextroamphetamine-amphetamine (ADDERALL XR) 30 MG 24 hr capsule; Take 1 capsule (30 mg total) by mouth every morning.  Dispense: 30 capsule; Refill: 0    2. Need for COVID-19 vaccine     Follow up in about 3 months (around 4/4/2022) for re-evaluate problem(s) discussed today.    Problem List Items Addressed This Visit     Attention deficit hyperactivity disorder (ADHD), predominantly inattentive type - Primary    Current Assessment & Plan     She reports that her current treatment is providing satisfactory relief of her attention deficit disorder symptoms and helping her compensate for her deficits and improve her level of functioning. She reports that she is tolerating her current treatment well. She reports no mood instability, tics, disordered sleep, rapid heart rate, palpitations or other apparent adverse effects. She is demonstrating no behaviors to suggest inappropriate use of prescribed medications. [place] Board of Pharmacy Controlled Prescription Drug Monitoring database was queried and showed no activity to suggest abuse, diversion, or other inappropriate use of prescription medications.          Relevant  Medications    dextroamphetamine-amphetamine (ADDERALL XR) 30 MG 24 hr capsule (Start on 3/6/2022)    dextroamphetamine-amphetamine (ADDERALL XR) 30 MG 24 hr capsule (Start on 2/6/2022)    dextroamphetamine-amphetamine (ADDERALL XR) 30 MG 24 hr capsule      Other Visit Diagnoses     Need for COVID-19 vaccine              PRESCRIPTION DRUG MANAGEMENT  Outpatient Medications Prior to Visit   Medication Sig Dispense Refill    dextroamphetamine-amphetamine (ADDERALL XR) 30 MG 24 hr capsule Take 1 capsule (30 mg total) by mouth every morning. 30 capsule 0    dextroamphetamine-amphetamine (ADDERALL XR) 30 MG 24 hr capsule Take 1 capsule (30 mg total) by mouth every morning. 30 capsule 0    dextroamphetamine-amphetamine (ADDERALL XR) 30 MG 24 hr capsule Take 1 capsule (30 mg total) by mouth every morning. 30 capsule 0    dextroamphetamine-amphetamine (ADDERALL XR) 30 MG 24 hr capsule Take 1 capsule (30 mg total) by mouth every morning. 30 capsule 0     No facility-administered medications prior to visit.      Medications Discontinued During This Encounter   Medication Reason    dextroamphetamine-amphetamine (ADDERALL XR) 30 MG 24 hr capsule Reorder    dextroamphetamine-amphetamine (ADDERALL XR) 30 MG 24 hr capsule Reorder    dextroamphetamine-amphetamine (ADDERALL XR) 30 MG 24 hr capsule Reorder    dextroamphetamine-amphetamine (ADDERALL XR) 30 MG 24 hr capsule Reorder      Medications Ordered This Encounter   Medications    dextroamphetamine-amphetamine (ADDERALL XR) 30 MG 24 hr capsule     Sig: Take 1 capsule (30 mg total) by mouth every morning.     Dispense:  30 capsule     Refill:  0     3 May fill on or after Sun, Mar 06, 2022.    dextroamphetamine-amphetamine (ADDERALL XR) 30 MG 24 hr capsule     Sig: Take 1 capsule (30 mg total) by mouth every morning.     Dispense:  30 capsule     Refill:  0     2 May fill on or after Sun, Feb 06, 2022.    dextroamphetamine-amphetamine (ADDERALL XR) 30 MG 24 hr capsule  "    Sig: Take 1 capsule (30 mg total) by mouth every morning.     Dispense:  30 capsule     Refill:  0     1 May fill on or after Thu, Jan 06, 2022.     Answers for HPI/ROS submitted by the patient on 1/6/2022  activity change: No  unexpected weight change: No  neck pain: No  hearing loss: No  rhinorrhea: No  trouble swallowing: No  eye discharge: No  visual disturbance: No  chest tightness: No  wheezing: No  chest pain: No  palpitations: No  blood in stool: No  constipation: No  vomiting: No  diarrhea: No  polydipsia: No  polyuria: No  difficulty urinating: No  hematuria: No  menstrual problem: No  dysuria: No  joint swelling: No  arthralgias: No  headaches: No  weakness: No  confusion: No  dysphoric mood: No    PYHSICAL EXAM   CONSTITUTIONAL: No apparent distress. Appears comfortable. Does not appear acutely ill or septic. Appears adequately hydrated.  CARDIOVASCULAR: No perioral cyanosis.  PULMONARY: Breathing unlabored. No audible wheezes. Easily speaks in full sentences.  PSYCHIATRIC: Alert and conversant and grossly oriented. Mood is grossly neutral. Affect appropriate. Judgment and insight grossly intact.    Documentation entered by me for this encounter may have been done in part using speech-recognition technology. Although I have made an effort to ensure accuracy, "sound like" errors may exist and should be interpreted in context. -DEVIN Estrada MD.    TOTAL TIME evaluating and managing this patient for this encounter was greater than or equal to 21 minutes. This time was spent personally by me on the following activities: pre-charting, assessing and addressing and counseling on age-appropriate health maintenance needs, review of any interval history, reviewing interval test results, obtaining history from the patient, evaluation of the patient's response to treatment, examination of the patient, managing and/or ordering prescription medications, medication counseling, communication with patient about " patient's condition and treatment plan, discussing planned follow-up and final documentation of the visit. This time was exclusive of any separately billable procedures for this patient and exclusive of time spent treating any other patients.    Each patient to whom medical services are provided by telemedicine is: (1) informed of the relationship between the physician and patient and the respective role of any other health care provider with respect to management of the patient; and (2) notified that he or she may decline to receive medical services by telemedicine and may withdraw from such care at any time.

## 2022-01-06 NOTE — PATIENT INSTRUCTIONS
"Alfie Abad.    It was nice seeing you during your virtual visit today. I'm happy to hear that your medicine is working well for you. I've sent you refills (listed below).    Your next appointment to re-evaluate your ADD treatment needs to be in 3 months, at least 7 business days before you will run out of the prescriptions you were given today. Your next appointment can be either a virtual visit (recommended) or an in-office visit.    Please take the time now to schedule that appointment. If you have any difficulty, send my staff a message, and they will be glad to help.    Finally, let me re-emphasize that the COVID-19 vaccinations are helping prevent hospitalization and death from COVID-19. The great majority of people hospitalized with COVID-19 are either unvaccinated or not fully vaccinated. I strongly recommend you get your COVID-19 vaccine.    Please learn more about the COVID-19 vaccine at [email or web address]. Afterward, please let me know if I can answer any questions you may have about the vaccine. I'll be glad to help.     Please take care of yourself. You are important to me.    All the best,    DEVIN Estrada MD     P.S. - Don't forget to review your After Visit Summary from your [employer] [facility]. (Just click on "Appointments," choose the appropriate past appointment, and click on "View After Visit Summary.")     Medications Ordered This Encounter   Medications    dextroamphetamine-amphetamine (ADDERALL XR) 30 MG 24 hr capsule     Sig: Take 1 capsule (30 mg total) by mouth every morning.     Dispense:  30 capsule     Refill:  0     3 May fill on or after Sun, Mar 06, 2022.    dextroamphetamine-amphetamine (ADDERALL XR) 30 MG 24 hr capsule     Sig: Take 1 capsule (30 mg total) by mouth every morning.     Dispense:  30 capsule     Refill:  0     2 May fill on or after Sun, Feb 06, 2022.    dextroamphetamine-amphetamine (ADDERALL XR) 30 MG 24 hr capsule     Sig: Take 1 " capsule (30 mg total) by mouth every morning.     Dispense:  30 capsule     Refill:  0     1 May fill on or after Thu, Jan 06, 2022.

## 2022-05-02 ENCOUNTER — PATIENT MESSAGE (OUTPATIENT)
Dept: ADMINISTRATIVE | Facility: HOSPITAL | Age: 32
End: 2022-05-02
Payer: MEDICAID

## 2022-05-09 ENCOUNTER — TELEPHONE (OUTPATIENT)
Dept: INTERNAL MEDICINE | Facility: CLINIC | Age: 32
End: 2022-05-09
Payer: MEDICAID

## 2022-05-09 NOTE — TELEPHONE ENCOUNTER
Spoke with patient and assisted her in scheduling a virtual visit this week with Dr. Webb for her medication refills. She verbalized understanding.

## 2022-05-09 NOTE — TELEPHONE ENCOUNTER
----- Message from Ashley Saucedo sent at 5/9/2022 11:44 AM CDT -----  Contact: VIJAY CRUZ [[id number]]  .Type:  Needs Medical Advice    Who Called: VIJAY CRUZ [[id number]]  Would the patient rather a call back or a response via [facility]? Call   Best Call Back Number: .[phone] (home)    Additional Information: Pt is req a call back in regards to having a virtual medication refill appointment.. Thanks AW

## 2022-05-12 ENCOUNTER — OFFICE VISIT (OUTPATIENT)
Dept: INTERNAL MEDICINE | Facility: CLINIC | Age: 32
End: 2022-05-12
Payer: MEDICAID

## 2022-05-12 DIAGNOSIS — F90.0 ATTENTION DEFICIT HYPERACTIVITY DISORDER (ADHD), PREDOMINANTLY INATTENTIVE TYPE: ICD-10-CM

## 2022-05-12 PROCEDURE — 99214 PR OFFICE/OUTPT VISIT, EST, LEVL IV, 30-39 MIN: ICD-10-PCS | Mod: 95,,, | Performed by: PEDIATRICS

## 2022-05-12 PROCEDURE — 1159F MED LIST DOCD IN RCRD: CPT | Mod: CPTII,95,, | Performed by: PEDIATRICS

## 2022-05-12 PROCEDURE — 1160F RVW MEDS BY RX/DR IN RCRD: CPT | Mod: CPTII,95,, | Performed by: PEDIATRICS

## 2022-05-12 PROCEDURE — 1160F PR REVIEW ALL MEDS BY PRESCRIBER/CLIN PHARMACIST DOCUMENTED: ICD-10-PCS | Mod: CPTII,95,, | Performed by: PEDIATRICS

## 2022-05-12 PROCEDURE — 99214 OFFICE O/P EST MOD 30 MIN: CPT | Mod: 95,,, | Performed by: PEDIATRICS

## 2022-05-12 PROCEDURE — 1159F PR MEDICATION LIST DOCUMENTED IN MEDICAL RECORD: ICD-10-PCS | Mod: CPTII,95,, | Performed by: PEDIATRICS

## 2022-05-12 RX ORDER — DEXTROAMPHETAMINE SACCHARATE, AMPHETAMINE ASPARTATE MONOHYDRATE, DEXTROAMPHETAMINE SULFATE AND AMPHETAMINE SULFATE 7.5; 7.5; 7.5; 7.5 MG/1; MG/1; MG/1; MG/1
30 CAPSULE, EXTENDED RELEASE ORAL EVERY MORNING
Qty: 30 CAPSULE | Refills: 0 | Status: SHIPPED | OUTPATIENT
Start: 2022-07-11 | End: 2022-08-08 | Stop reason: SDUPTHER

## 2022-05-12 RX ORDER — DEXTROAMPHETAMINE SACCHARATE, AMPHETAMINE ASPARTATE MONOHYDRATE, DEXTROAMPHETAMINE SULFATE AND AMPHETAMINE SULFATE 7.5; 7.5; 7.5; 7.5 MG/1; MG/1; MG/1; MG/1
30 CAPSULE, EXTENDED RELEASE ORAL EVERY MORNING
Qty: 30 CAPSULE | Refills: 0 | Status: SHIPPED | OUTPATIENT
Start: 2022-06-11 | End: 2022-08-08 | Stop reason: SDUPTHER

## 2022-05-12 RX ORDER — DEXTROAMPHETAMINE SACCHARATE, AMPHETAMINE ASPARTATE MONOHYDRATE, DEXTROAMPHETAMINE SULFATE AND AMPHETAMINE SULFATE 7.5; 7.5; 7.5; 7.5 MG/1; MG/1; MG/1; MG/1
30 CAPSULE, EXTENDED RELEASE ORAL EVERY MORNING
Qty: 30 CAPSULE | Refills: 0 | Status: SHIPPED | OUTPATIENT
Start: 2022-05-12 | End: 2022-08-08 | Stop reason: SDUPTHER

## 2022-05-12 NOTE — PROGRESS NOTES
TELEMEDICINE VIRTUAL VISIT    Subjective:       Patient ID: Alfie Carroll is a 32 y.o. female.    Chief Complaint: ADHD and Medication Refill    Partial televist and partial audio with technical difficulties. She needs refill of adderall XR 30 mg. Works well, had just ran out, does not take if off of school. No HA, appetite suppression, insomnia, paplitations, TICs. [place]  website reviewed, appropriate use noted.     Medication Refill  Pertinent negatives include no arthralgias, chest pain, headaches, joint swelling, neck pain, vomiting or weakness.     Review of Systems   Constitutional: Negative for activity change and unexpected weight change.   HENT: Negative for hearing loss, rhinorrhea and trouble swallowing.    Eyes: Negative for discharge and visual disturbance.   Respiratory: Negative for chest tightness and wheezing.    Cardiovascular: Negative for chest pain and palpitations.   Gastrointestinal: Negative for blood in stool, constipation, diarrhea and vomiting.   Endocrine: Negative for polydipsia and polyuria.   Genitourinary: Negative for difficulty urinating, dysuria, hematuria and menstrual problem.   Musculoskeletal: Negative for arthralgias, joint swelling and neck pain.   Neurological: Negative for weakness and headaches.   Psychiatric/Behavioral: Negative for confusion and dysphoric mood.       Objective:      CONSTITUTIONAL: No apparent distress. Does not appear acutely ill or septic. Appears adequately hydrated.  PULM: Breathing unlabored.  PSYCHIATRIC: Alert and conversant and grossly oriented. Mood is grossly neutral. Affect appropriate. Judgment and insight grossly intact.      Assessment:       1. Attention deficit hyperactivity disorder (ADHD), predominantly inattentive type        Plan:       Attention deficit hyperactivity disorder (ADHD), predominantly inattentive type  -     dextroamphetamine-amphetamine (ADDERALL XR) 30 MG 24 hr capsule; Take 1 capsule (30 mg total) by mouth every  "morning.  Dispense: 30 capsule; Refill: 0  -     dextroamphetamine-amphetamine (ADDERALL XR) 30 MG 24 hr capsule; Take 1 capsule (30 mg total) by mouth every morning.  Dispense: 30 capsule; Refill: 0  -     dextroamphetamine-amphetamine (ADDERALL XR) 30 MG 24 hr capsule; Take 1 capsule (30 mg total) by mouth every morning.  Dispense: 30 capsule; Refill: 0       She is doing well. Refill 3 dated erx. F/U physical appt with Dr Estrada in 3 months.  Documentation entered by me for this encounter may have been done in part using speech-recognition technology. Although I have made an effort to ensure accuracy, "sound like" errors may exist and should be interpreted in context. JUANA Webb MD    Visit Details: This visit was a telemedicine virtual visit with synchronous audio and video. Alfie reported that her location at the time of this visit was in the [facility]. Alfie had the choice to come into office to receive these medical services. Alfie chose and consented to receive these medical services by telemedicine.  "

## 2022-06-27 ENCOUNTER — PATIENT OUTREACH (OUTPATIENT)
Dept: ADMINISTRATIVE | Facility: HOSPITAL | Age: 32
End: 2022-06-27
Payer: MEDICAID

## 2022-06-27 NOTE — PROGRESS NOTES
Working Cervical Cancer Report; chart searched, [facility] & [facility]; no records found. I called pt to schedule overdue pap smear. LVM & portal message.

## 2022-08-08 ENCOUNTER — OFFICE VISIT (OUTPATIENT)
Dept: INTERNAL MEDICINE | Facility: CLINIC | Age: 32
End: 2022-08-08
Payer: MEDICAID

## 2022-08-08 VITALS
WEIGHT: 169.31 LBS | BODY MASS INDEX: 28.91 KG/M2 | HEART RATE: 81 BPM | HEIGHT: 64 IN | TEMPERATURE: 99 F | DIASTOLIC BLOOD PRESSURE: 70 MMHG | SYSTOLIC BLOOD PRESSURE: 120 MMHG | OXYGEN SATURATION: 99 %

## 2022-08-08 DIAGNOSIS — F90.0 ATTENTION DEFICIT HYPERACTIVITY DISORDER (ADHD), PREDOMINANTLY INATTENTIVE TYPE: ICD-10-CM

## 2022-08-08 DIAGNOSIS — Z13.1 SCREENING FOR DIABETES MELLITUS: ICD-10-CM

## 2022-08-08 DIAGNOSIS — Z28.21 IMMUNIZATION NOT CARRIED OUT BECAUSE OF PATIENT REFUSAL: ICD-10-CM

## 2022-08-08 DIAGNOSIS — E66.3 OVERWEIGHT (BMI 25.0-29.9): Chronic | ICD-10-CM

## 2022-08-08 DIAGNOSIS — Z01.419 PAP SMEAR, AS PART OF ROUTINE GYNECOLOGICAL EXAMINATION: ICD-10-CM

## 2022-08-08 DIAGNOSIS — Z00.00 PREVENTATIVE HEALTH CARE: Primary | ICD-10-CM

## 2022-08-08 DIAGNOSIS — Z13.220 SCREENING FOR LIPID DISORDERS: ICD-10-CM

## 2022-08-08 LAB
CHOLEST SERPL-MCNC: 125 MG/DL (ref 120–199)
CHOLEST/HDLC SERPL: 2.5 {RATIO} (ref 2–5)
GLUCOSE SERPL-MCNC: 79 MG/DL (ref 70–110)
HDLC SERPL-MCNC: 51 MG/DL (ref 40–75)
HDLC SERPL: 40.8 % (ref 20–50)
LDLC SERPL CALC-MCNC: 52.2 MG/DL (ref 63–159)
NONHDLC SERPL-MCNC: 74 MG/DL
TRIGL SERPL-MCNC: 109 MG/DL (ref 30–150)

## 2022-08-08 PROCEDURE — 99214 OFFICE O/P EST MOD 30 MIN: CPT | Mod: PBBFAC | Performed by: FAMILY MEDICINE

## 2022-08-08 PROCEDURE — 99999 PR PBB SHADOW E&M-EST. PATIENT-LVL IV: ICD-10-PCS | Mod: PBBFAC,,, | Performed by: FAMILY MEDICINE

## 2022-08-08 PROCEDURE — 99395 PR PREVENTIVE VISIT,EST,18-39: ICD-10-PCS | Mod: S$PBB,,, | Performed by: FAMILY MEDICINE

## 2022-08-08 PROCEDURE — 1160F PR REVIEW ALL MEDS BY PRESCRIBER/CLIN PHARMACIST DOCUMENTED: ICD-10-PCS | Mod: CPTII,,, | Performed by: FAMILY MEDICINE

## 2022-08-08 PROCEDURE — 1159F PR MEDICATION LIST DOCUMENTED IN MEDICAL RECORD: ICD-10-PCS | Mod: CPTII,,, | Performed by: FAMILY MEDICINE

## 2022-08-08 PROCEDURE — 80061 LIPID PANEL: CPT | Performed by: FAMILY MEDICINE

## 2022-08-08 PROCEDURE — 99999 PR PBB SHADOW E&M-EST. PATIENT-LVL IV: CPT | Mod: PBBFAC,,, | Performed by: FAMILY MEDICINE

## 2022-08-08 PROCEDURE — 82947 ASSAY GLUCOSE BLOOD QUANT: CPT | Performed by: FAMILY MEDICINE

## 2022-08-08 PROCEDURE — 3078F DIAST BP <80 MM HG: CPT | Mod: CPTII,,, | Performed by: FAMILY MEDICINE

## 2022-08-08 PROCEDURE — 3008F BODY MASS INDEX DOCD: CPT | Mod: CPTII,,, | Performed by: FAMILY MEDICINE

## 2022-08-08 PROCEDURE — 3008F PR BODY MASS INDEX (BMI) DOCUMENTED: ICD-10-PCS | Mod: CPTII,,, | Performed by: FAMILY MEDICINE

## 2022-08-08 PROCEDURE — 3074F SYST BP LT 130 MM HG: CPT | Mod: CPTII,,, | Performed by: FAMILY MEDICINE

## 2022-08-08 PROCEDURE — 1159F MED LIST DOCD IN RCRD: CPT | Mod: CPTII,,, | Performed by: FAMILY MEDICINE

## 2022-08-08 PROCEDURE — 1160F RVW MEDS BY RX/DR IN RCRD: CPT | Mod: CPTII,,, | Performed by: FAMILY MEDICINE

## 2022-08-08 PROCEDURE — 99395 PREV VISIT EST AGE 18-39: CPT | Mod: S$PBB,,, | Performed by: FAMILY MEDICINE

## 2022-08-08 PROCEDURE — 3078F PR MOST RECENT DIASTOLIC BLOOD PRESSURE < 80 MM HG: ICD-10-PCS | Mod: CPTII,,, | Performed by: FAMILY MEDICINE

## 2022-08-08 PROCEDURE — 3074F PR MOST RECENT SYSTOLIC BLOOD PRESSURE < 130 MM HG: ICD-10-PCS | Mod: CPTII,,, | Performed by: FAMILY MEDICINE

## 2022-08-08 RX ORDER — DEXTROAMPHETAMINE SACCHARATE, AMPHETAMINE ASPARTATE MONOHYDRATE, DEXTROAMPHETAMINE SULFATE AND AMPHETAMINE SULFATE 7.5; 7.5; 7.5; 7.5 MG/1; MG/1; MG/1; MG/1
30 CAPSULE, EXTENDED RELEASE ORAL EVERY MORNING
Qty: 30 CAPSULE | Refills: 0 | Status: SHIPPED | OUTPATIENT
Start: 2022-08-08 | End: 2022-10-10 | Stop reason: SDUPTHER

## 2022-08-08 RX ORDER — DEXTROAMPHETAMINE SACCHARATE, AMPHETAMINE ASPARTATE MONOHYDRATE, DEXTROAMPHETAMINE SULFATE AND AMPHETAMINE SULFATE 7.5; 7.5; 7.5; 7.5 MG/1; MG/1; MG/1; MG/1
30 CAPSULE, EXTENDED RELEASE ORAL EVERY MORNING
Qty: 30 CAPSULE | Refills: 0 | Status: SHIPPED | OUTPATIENT
Start: 2022-09-08 | End: 2022-10-10 | Stop reason: SDUPTHER

## 2022-08-08 RX ORDER — DEXTROAMPHETAMINE SACCHARATE, AMPHETAMINE ASPARTATE MONOHYDRATE, DEXTROAMPHETAMINE SULFATE AND AMPHETAMINE SULFATE 7.5; 7.5; 7.5; 7.5 MG/1; MG/1; MG/1; MG/1
30 CAPSULE, EXTENDED RELEASE ORAL EVERY MORNING
Qty: 30 CAPSULE | Refills: 0 | Status: SHIPPED | OUTPATIENT
Start: 2022-10-08 | End: 2022-10-10 | Stop reason: SDUPTHER

## 2022-08-08 NOTE — PATIENT INSTRUCTIONS
The COVID-19 vaccinations are helping prevent hospitalization and death from COVID-19. The great majority of people hospitalized with COVID-19 are either unvaccinated or not fully vaccinated. I strongly recommend you get your COVID-19.    Please learn more about the COVID-19 vaccine at [email or web address]. Afterward, please let me know if I can answer any questions you may have about the vaccine. I'll be glad to help.     The quickest and easiest way to schedule an appointment for your COVID-19 vaccination is from the [employer] a[facility]en or [facility] online at [email or web address]. You can also schedule an appointment for your COVID-19 vaccination by calling [phone].    Please take care of yourself. You are important to me.

## 2022-08-08 NOTE — ASSESSMENT & PLAN NOTE
Alfie reports that current treatment (Adderall-XR) is providing satisfactory relief of attention deficit disorder symptoms and helping compensate for deficits and improve level of functioning. Alfie appears to be tolerating current treatment well, reporting no mood instability, tics, disordered sleep, rapid heart rate, palpitations or other apparent adverse effects. I have observed no behaviors to suggest inappropriate use of prescribed medications. [place] Board of Pharmacy Controlled Prescription Drug Monitoring database was queried and showed no activity to suggest abuse, diversion, or other inappropriate use of prescription medications.

## 2022-08-08 NOTE — ASSESSMENT & PLAN NOTE
"Wt Readings from Last 6 Encounters:   08/08/22 76.8 kg (169 lb 5 oz)   06/04/21 80.4 kg (177 lb 4 oz)   03/04/21 81.5 kg (179 lb 10.8 oz)   02/20/20 79.3 kg (174 lb 13.2 oz)   11/15/19 77.3 kg (170 lb 6.7 oz)   08/15/19 77.7 kg (171 lb 4.8 oz)     Estimated body mass index is 29.06 kg/m² as calculated from the following:    Height as of this encounter: 5' 4" (1.626 m).    Weight as of this encounter: 76.8 kg (169 lb 5 oz).    "

## 2022-08-08 NOTE — PROGRESS NOTES
WELLNESS VISIT (PREVENTIVE SERVICES)  8/8/22  8:30 AM CDT    HEALTH MAINTENANCE INTERVENTIONS - UP TO DATE  Health Maintenance Topics with due status: Not Due       Topic Last Completion Date    TETANUS VACCINE 09/15/2017    Influenza Vaccine 12/19/2017       HEALTH MAINTENANCE INTERVENTIONS - DUE OR DUE SOON  Health Maintenance Due   Topic Date Due    COVID-19 Vaccine (1) Never done    Cervical Cancer Screening  05/15/2022       REASON FOR VISIT: Annual Wellness Visit (Preventive Services)  CHIEF COMPLAINT: Follow-up and Annual Exam    DIAGNOSES SPECIFICALLY EVALUATED AND TREATED THIS ENCOUNTER  1. Preventative health care  - Ambulatory referral/consult to Gynecology; Future  - Ambulatory referral/consult to [facility]; Future  - Lipid Panel  - Glucose, Random    2. Attention deficit hyperactivity disorder (ADHD), predominantly inattentive type  - dextroamphetamine-amphetamine (ADDERALL XR) 30 MG 24 hr capsule; Take 1 capsule (30 mg total) by mouth every morning.  Dispense: 30 capsule; Refill: 0  - dextroamphetamine-amphetamine (ADDERALL XR) 30 MG 24 hr capsule; Take 1 capsule (30 mg total) by mouth every morning.  Dispense: 30 capsule; Refill: 0  - dextroamphetamine-amphetamine (ADDERALL XR) 30 MG 24 hr capsule; Take 1 capsule (30 mg total) by mouth every morning.  Dispense: 30 capsule; Refill: 0    3. Overweight (BMI 25.0-29.9)  - Ambulatory referral/consult to [facility]; Future    4. Pap smear, as part of routine gynecological examination  - Ambulatory referral/consult to Gynecology; Future    5. Screening for lipid disorders  - Lipid Panel    6. Screening for diabetes mellitus  - Glucose, Random    7. At increased risk for hospitalization and death from COVID-19 infection due to unvaccinated state    Ready to check out. See orders.   Clinic collect labs.   Schedule virtual visit at the end of October.    Follow up in about 12 weeks (around 10/31/2022) for re-evaluate  "problem(s) discussed today.   No future appointments.  Problem List Items Addressed This Visit     Attention deficit hyperactivity disorder (ADHD), predominantly inattentive type (Chronic)    Current Assessment & Plan     Alfie reports that current treatment (Adderall-XR) is providing satisfactory relief of attention deficit disorder symptoms and helping compensate for deficits and improve level of functioning. Alfie appears to be tolerating current treatment well, reporting no mood instability, tics, disordered sleep, rapid heart rate, palpitations or other apparent adverse effects. I have observed no behaviors to suggest inappropriate use of prescribed medications. [place] Board of Pharmacy Controlled Prescription Drug Monitoring database was queried and showed no activity to suggest abuse, diversion, or other inappropriate use of prescription medications.            Relevant Medications    dextroamphetamine-amphetamine (ADDERALL XR) 30 MG 24 hr capsule    dextroamphetamine-amphetamine (ADDERALL XR) 30 MG 24 hr capsule (Start on 9/8/2022)    dextroamphetamine-amphetamine (ADDERALL XR) 30 MG 24 hr capsule (Start on 10/8/2022)    Overweight (BMI 25.0-29.9) (Chronic)    Current Assessment & Plan     Wt Readings from Last 6 Encounters:   08/08/22 76.8 kg (169 lb 5 oz)   06/04/21 80.4 kg (177 lb 4 oz)   03/04/21 81.5 kg (179 lb 10.8 oz)   02/20/20 79.3 kg (174 lb 13.2 oz)   11/15/19 77.3 kg (170 lb 6.7 oz)   08/15/19 77.7 kg (171 lb 4.8 oz)     Estimated body mass index is 29.06 kg/m² as calculated from the following:    Height as of this encounter: 5' 4" (1.626 m).    Weight as of this encounter: 76.8 kg (169 lb 5 oz).             Relevant Orders    Ambulatory referral/consult to [facility]    At increased risk for hospitalization and death from COVID-19 infection due to unvaccinated state      Other Visit Diagnoses     Preventative health care    -  Primary    Relevant Orders    Ambulatory " referral/consult to Gynecology    Ambulatory referral/consult to [facility]    Lipid Panel    Glucose, Random    Pap smear, as part of routine gynecological examination        Relevant Orders    Ambulatory referral/consult to Gynecology    Screening for lipid disorders        Relevant Orders    Lipid Panel    Screening for diabetes mellitus        Relevant Orders    Glucose, Random      Unless noted herein, any chronic conditions are represented as and appear compensated/controlled and stable, and no other significant complaints or concerns were reported.    PRESCRIPTION DRUG MANAGEMENT  Outpatient Medications Prior to Visit   Medication Sig Dispense Refill    dextroamphetamine-amphetamine (ADDERALL XR) 30 MG 24 hr capsule Take 1 capsule (30 mg total) by mouth every morning. 30 capsule 0    dextroamphetamine-amphetamine (ADDERALL XR) 30 MG 24 hr capsule Take 1 capsule (30 mg total) by mouth every morning. 30 capsule 0    dextroamphetamine-amphetamine (ADDERALL XR) 30 MG 24 hr capsule Take 1 capsule (30 mg total) by mouth every morning. 30 capsule 0     No facility-administered medications prior to visit.     Medications Discontinued During This Encounter   Medication Reason    dextroamphetamine-amphetamine (ADDERALL XR) 30 MG 24 hr capsule Reorder    dextroamphetamine-amphetamine (ADDERALL XR) 30 MG 24 hr capsule Reorder    dextroamphetamine-amphetamine (ADDERALL XR) 30 MG 24 hr capsule Reorder     Medications Ordered This Encounter   Medications    dextroamphetamine-amphetamine (ADDERALL XR) 30 MG 24 hr capsule     Sig: Take 1 capsule (30 mg total) by mouth every morning.     Dispense:  30 capsule     Refill:  0     1 May fill on or after Mon, Aug 08, 2022.    dextroamphetamine-amphetamine (ADDERALL XR) 30 MG 24 hr capsule     Sig: Take 1 capsule (30 mg total) by mouth every morning.     Dispense:  30 capsule     Refill:  0     2 May fill on or after Thu, Sep 08, 2022.     "dextroamphetamine-amphetamine (ADDERALL XR) 30 MG 24 hr capsule     Sig: Take 1 capsule (30 mg total) by mouth every morning.     Dispense:  30 capsule     Refill:  0     3 May fill on or after Sat, Oct 08, 2022.     PHYSICAL EXAM  Vitals:    08/08/22 0839   BP: 120/70   BP Location: Right arm   Patient Position: Sitting   BP Method: Medium (Manual)   Pulse: 81   Temp: 98.5 °F (36.9 °C)   TempSrc: Tympanic   SpO2: 99%   Weight: 76.8 kg (169 lb 5 oz)   Height: 5' 4" (1.626 m)   CONSTITUTIONAL: Vital signs noted. No apparent distress. Does not appear acutely ill or septic. Appears adequately hydrated.  EYE: Sclerae anicteric. Lids and conjunctiva unremarkable.  ENT: External ENT unremarkable. Hearing grossly intact.  NECK: Trachea midline. Thyroid nontender.  PULM: Lungs clear. Breathing unlabored.  CV: Auscultation reveals regular rate and rhythm. Normal heart sounds. No carotid bruit.  GI: Soft and nontender. Bowel sounds normal.  DERM: Skin warm and moist with normal turgor.  NEURO: There are no gross focal motor deficits or gross deficits of cranial nerves III-XII.  PSYCH: Alert and oriented x 3. Mood is grossly neutral. Affect appropriate. Judgment and insight grossly intact.     PAST MEDICAL HISTORY  Alfie has a past medical history of Attention deficit hyperactivity disorder (ADHD), predominantly inattentive type.    SURGICAL HISTORY  Alfie has a past surgical history that includes Vaginal delivery.    FAMILY HISTORY  Alfie family history includes Hypertension in her mother.     ALLERGIES  Review of patient's allergies indicates:  No Known Allergies    SOCIAL HISTORY  Alfie  reports that she has never smoked. She has never used smokeless tobacco. She reports that she does not drink alcohol and does not use drugs.     Documentation entered by me for this encounter may have been done in part using speech-recognition technology. Although I have made an effort to ensure accuracy, "sound like" errors may " exist and should be interpreted in context.

## 2022-08-27 NOTE — PROGRESS NOTES
"Results look GREAT!  Keep up the good work!  ----------  If you want to learn more about your test results and what they mean, it's as simple as 1, 2, 3.  1. Log in to [facility] using the [facility] or online at [email or web address].   2. From the "Test Results" tab, click on the test you want to know more about.  3. If using the mobile ayleen, click the "Get Info" icon. (The "Get Info" icon looks like a [place] with a lower case letter i inside it.) If using your computer, click the "Get Info" icon or the "About This Test" link."

## 2022-10-10 ENCOUNTER — PATIENT MESSAGE (OUTPATIENT)
Dept: INTERNAL MEDICINE | Facility: CLINIC | Age: 32
End: 2022-10-10

## 2022-10-10 ENCOUNTER — OFFICE VISIT (OUTPATIENT)
Dept: INTERNAL MEDICINE | Facility: CLINIC | Age: 32
End: 2022-10-10
Payer: MEDICAID

## 2022-10-10 VITALS — SYSTOLIC BLOOD PRESSURE: 120 MMHG | DIASTOLIC BLOOD PRESSURE: 70 MMHG

## 2022-10-10 DIAGNOSIS — F90.0 ATTENTION DEFICIT HYPERACTIVITY DISORDER (ADHD), PREDOMINANTLY INATTENTIVE TYPE: ICD-10-CM

## 2022-10-10 PROCEDURE — 3078F PR MOST RECENT DIASTOLIC BLOOD PRESSURE < 80 MM HG: ICD-10-PCS | Mod: CPTII,95,, | Performed by: FAMILY MEDICINE

## 2022-10-10 PROCEDURE — 1159F MED LIST DOCD IN RCRD: CPT | Mod: CPTII,95,, | Performed by: FAMILY MEDICINE

## 2022-10-10 PROCEDURE — 3078F DIAST BP <80 MM HG: CPT | Mod: CPTII,95,, | Performed by: FAMILY MEDICINE

## 2022-10-10 PROCEDURE — 3074F PR MOST RECENT SYSTOLIC BLOOD PRESSURE < 130 MM HG: ICD-10-PCS | Mod: CPTII,95,, | Performed by: FAMILY MEDICINE

## 2022-10-10 PROCEDURE — 3074F SYST BP LT 130 MM HG: CPT | Mod: CPTII,95,, | Performed by: FAMILY MEDICINE

## 2022-10-10 PROCEDURE — 1159F PR MEDICATION LIST DOCUMENTED IN MEDICAL RECORD: ICD-10-PCS | Mod: CPTII,95,, | Performed by: FAMILY MEDICINE

## 2022-10-10 PROCEDURE — 99213 PR OFFICE/OUTPT VISIT, EST, LEVL III, 20-29 MIN: ICD-10-PCS | Mod: 95,,, | Performed by: FAMILY MEDICINE

## 2022-10-10 PROCEDURE — 1160F PR REVIEW ALL MEDS BY PRESCRIBER/CLIN PHARMACIST DOCUMENTED: ICD-10-PCS | Mod: CPTII,95,, | Performed by: FAMILY MEDICINE

## 2022-10-10 PROCEDURE — 1160F RVW MEDS BY RX/DR IN RCRD: CPT | Mod: CPTII,95,, | Performed by: FAMILY MEDICINE

## 2022-10-10 PROCEDURE — 99213 OFFICE O/P EST LOW 20 MIN: CPT | Mod: 95,,, | Performed by: FAMILY MEDICINE

## 2022-10-10 RX ORDER — DEXTROAMPHETAMINE SACCHARATE, AMPHETAMINE ASPARTATE MONOHYDRATE, DEXTROAMPHETAMINE SULFATE AND AMPHETAMINE SULFATE 7.5; 7.5; 7.5; 7.5 MG/1; MG/1; MG/1; MG/1
30 CAPSULE, EXTENDED RELEASE ORAL EVERY MORNING
Qty: 30 CAPSULE | Refills: 0 | Status: SHIPPED | OUTPATIENT
Start: 2022-10-10 | End: 2023-01-03 | Stop reason: SDUPTHER

## 2022-10-10 RX ORDER — DEXTROAMPHETAMINE SACCHARATE, AMPHETAMINE ASPARTATE MONOHYDRATE, DEXTROAMPHETAMINE SULFATE AND AMPHETAMINE SULFATE 7.5; 7.5; 7.5; 7.5 MG/1; MG/1; MG/1; MG/1
30 CAPSULE, EXTENDED RELEASE ORAL EVERY MORNING
Qty: 30 CAPSULE | Refills: 0 | Status: SHIPPED | OUTPATIENT
Start: 2022-12-05 | End: 2023-01-03 | Stop reason: SDUPTHER

## 2022-10-10 RX ORDER — DEXTROAMPHETAMINE SACCHARATE, AMPHETAMINE ASPARTATE MONOHYDRATE, DEXTROAMPHETAMINE SULFATE AND AMPHETAMINE SULFATE 7.5; 7.5; 7.5; 7.5 MG/1; MG/1; MG/1; MG/1
30 CAPSULE, EXTENDED RELEASE ORAL EVERY MORNING
Qty: 30 CAPSULE | Refills: 0 | Status: SHIPPED | OUTPATIENT
Start: 2022-11-07 | End: 2023-01-03 | Stop reason: SDUPTHER

## 2022-10-10 NOTE — ASSESSMENT & PLAN NOTE
Alfie reports that current treatment (Adderall-XR 30 Mg QAM) is providing satisfactory relief of attention deficit disorder symptoms and helping compensate for deficits and improve level of functioning in school and at home. Alfie appears to be tolerating current treatment well, reporting no mood instability, tics, disordered sleep, rapid heart rate, palpitations or other apparent adverse effects. I have observed no behaviors to suggest inappropriate use of prescribed medications. [place] Board of Pharmacy Controlled Prescription Drug Monitoring database was queried and showed no activity to suggest abuse, diversion, or other inappropriate use of prescription medications.

## 2022-10-10 NOTE — PROGRESS NOTES
TELEMEDICINE VIRTUAL VISIT  10/10/22  7:00 AM CDT    Visit Details: This visit was a telemedicine virtual visit with synchronous audio and video. Alfie represented that her location at the time of this visit was in the [facility]. Alfie chose and consented to receive these medical services by telemedicine.    CHIEF COMPLAINT: Follow-up and Medication Refill    DIAGNOSES SPECIFICALLY EVALUATED AND TREATED THIS ENCOUNTER  1. Attention deficit hyperactivity disorder (ADHD), predominantly inattentive type  - dextroamphetamine-amphetamine (ADDERALL XR) 30 MG 24 hr capsule; Take 1 capsule (30 mg total) by mouth every morning.  Dispense: 30 capsule; Refill: 0  - dextroamphetamine-amphetamine (ADDERALL XR) 30 MG 24 hr capsule; Take 1 capsule (30 mg total) by mouth every morning.  Dispense: 30 capsule; Refill: 0  - dextroamphetamine-amphetamine (ADDERALL XR) 30 MG 24 hr capsule; Take 1 capsule (30 mg total) by mouth every morning.  Dispense: 30 capsule; Refill: 0     Follow up in about 3 months (around 1/10/2023) for re-evaluate problem(s) discussed today.   No future appointments.  Problem List Items Addressed This Visit       Attention deficit hyperactivity disorder (ADHD), predominantly inattentive type (Chronic)    Current Assessment & Plan     Alfie reports that current treatment (Adderall-XR 30 Mg QAM) is providing satisfactory relief of attention deficit disorder symptoms and helping compensate for deficits and improve level of functioning in school and at home. Alfie appears to be tolerating current treatment well, reporting no mood instability, tics, disordered sleep, rapid heart rate, palpitations or other apparent adverse effects. I have observed no behaviors to suggest inappropriate use of prescribed medications. [place] [employer] Pharm[employer] Controlled Prescription Drug Monitoring database was queried and showed no activity to suggest abuse, diversion, or other inappropriate use of prescription  medications.          Relevant Medications    dextroamphetamine-amphetamine (ADDERALL XR) 30 MG 24 hr capsule    dextroamphetamine-amphetamine (ADDERALL XR) 30 MG 24 hr capsule (Start on 11/7/2022)    dextroamphetamine-amphetamine (ADDERALL XR) 30 MG 24 hr capsule (Start on 12/5/2022)   Unless noted herein, any chronic conditions are represented as and appear compensated/controlled and stable, and no other significant complaints or concerns were reported.    PRESCRIPTION DRUG MANAGEMENT  Outpatient Medications Prior to Visit   Medication Sig Dispense Refill    dextroamphetamine-amphetamine (ADDERALL XR) 30 MG 24 hr capsule Take 1 capsule (30 mg total) by mouth every morning. 30 capsule 0    dextroamphetamine-amphetamine (ADDERALL XR) 30 MG 24 hr capsule Take 1 capsule (30 mg total) by mouth every morning. 30 capsule 0    dextroamphetamine-amphetamine (ADDERALL XR) 30 MG 24 hr capsule Take 1 capsule (30 mg total) by mouth every morning. 30 capsule 0     No facility-administered medications prior to visit.     Medications Discontinued During This Encounter   Medication Reason    dextroamphetamine-amphetamine (ADDERALL XR) 30 MG 24 hr capsule Reorder    dextroamphetamine-amphetamine (ADDERALL XR) 30 MG 24 hr capsule Reorder    dextroamphetamine-amphetamine (ADDERALL XR) 30 MG 24 hr capsule Reorder    dextroamphetamine-amphetamine (ADDERALL XR) 30 MG 24 hr capsule Other - Commment Required     Medications Ordered This Encounter   Medications    dextroamphetamine-amphetamine (ADDERALL XR) 30 MG 24 hr capsule     Sig: Take 1 capsule (30 mg total) by mouth every morning.     Dispense:  30 capsule     Refill:  0     1 May fill on or after Mon, Oct 10, 2022.    dextroamphetamine-amphetamine (ADDERALL XR) 30 MG 24 hr capsule     Sig: Take 1 capsule (30 mg total) by mouth every morning.     Dispense:  30 capsule     Refill:  0     2 May fill on or after Mon, Nov 07, 2022.    dextroamphetamine-amphetamine (ADDERALL XR) 30 MG 24  hr capsule     Sig: Take 1 capsule (30 mg total) by mouth every morning.     Dispense:  30 capsule     Refill:  0     3 May fill on or after Mon, Dec 05, 2022.     Review of Systems   Constitutional:  Negative for activity change and unexpected weight change.   HENT:  Negative for hearing loss, rhinorrhea and trouble swallowing.    Eyes:  Negative for discharge and visual disturbance.   Respiratory:  Negative for chest tightness and wheezing.    Cardiovascular:  Negative for chest pain and palpitations.   Gastrointestinal:  Negative for blood in stool, constipation, diarrhea and vomiting.   Endocrine: Negative for polydipsia and polyuria.   Genitourinary:  Negative for difficulty urinating, dysuria, hematuria and menstrual problem.   Musculoskeletal:  Negative for arthralgias, joint swelling and neck pain.   Neurological:  Negative for weakness and headaches.   Psychiatric/Behavioral:  Negative for confusion and dysphoric mood.    PHYSICAL EXAM  CONSTITUTIONAL: No apparent distress. Appears comfortable. Does not appear acutely ill or septic. Appears adequately hydrated.  CARDIOVASCULAR: No perioral cyanosis.  PULMONARY: Breathing unlabored. No audible wheezes. Easily speaks in full sentences.  PSYCHIATRIC: Alert and conversant and grossly oriented. Mood is grossly neutral. Affect appropriate. Judgment and insight grossly intact.    Orders Placed This Encounter    dextroamphetamine-amphetamine (ADDERALL XR) 30 MG 24 hr capsule    dextroamphetamine-amphetamine (ADDERALL XR) 30 MG 24 hr capsule    dextroamphetamine-amphetamine (ADDERALL XR) 30 MG 24 hr capsule     TOTAL TIME evaluating and managing this patient for this encounter was greater than or equal to 19 minutes. This time was spent personally by me on the following activities: review of patient's past medical history, assessing age-appropriate health maintenance needs, review of any interval history, obtaining history from the patient, examination of the  "patient, evaluation of the patient's response to treatment, medication reconciliation, review of [facility] Controlled Prescription Drug Monitoring database records for the patient, managing and/or ordering prescription medications, educating patient and answering their questions about treatment plan, goals of treatment, and follow-up, and final documentation of the visit. This time was exclusive of any separately billable procedures for this patient and exclusive of time spent treating any other patients.    Documentation entered by me for this encounter may have been done in part using speech-recognition technology. Although I have made an effort to ensure accuracy, "sound like" errors may exist and should be interpreted in context.    Each patient to whom medical services are provided by telemedicine is: (1) informed of the relationship between the physician and patient and the respective role of any other health care provider with respect to management of the patient; and (2) notified that he or she may decline to receive medical services by telemedicine and may withdraw from such care at any time.    NEXT VISIT  Anticipate continuing present treatment plan if continues to do well.   "

## 2022-10-10 NOTE — PATIENT INSTRUCTIONS
"Alfie Abad.    It was nice seeing you during your virtual visit today.    Someone from my office should be contacting you soon to help schedule your next virtual visit appointment for about three months from now. If you haven't been contacted within the next 3-4 business days, reply to this message and let me know.    Be sure that your appointment is scheduled at least five business days before you will run out of your Adderall-XR prescribed to you today.    Have a happy holidays!    All the best,    DEIVN Estrada MD   P.S. - Don't forget to review your After Visit Summary on [facility]. (Just click on "Appointments," choose the appropriate past appointment, and click on "View After Visit Summary.")    Medications Ordered This Encounter   Medications    dextroamphetamine-amphetamine (ADDERALL XR) 30 MG 24 hr capsule     Sig: Take 1 capsule (30 mg total) by mouth every morning.     Dispense:  30 capsule     Refill:  0     1 May fill on or after Mon, Oct 10, 2022.    dextroamphetamine-amphetamine (ADDERALL XR) 30 MG 24 hr capsule     Sig: Take 1 capsule (30 mg total) by mouth every morning.     Dispense:  30 capsule     Refill:  0     2 May fill on or after Mon, Nov 07, 2022.    dextroamphetamine-amphetamine (ADDERALL XR) 30 MG 24 hr capsule     Sig: Take 1 capsule (30 mg total) by mouth every morning.     Dispense:  30 capsule     Refill:  0     3 May fill on or after Mon, Dec 05, 2022.      "

## 2023-01-03 ENCOUNTER — OFFICE VISIT (OUTPATIENT)
Dept: INTERNAL MEDICINE | Facility: CLINIC | Age: 33
End: 2023-01-03
Payer: MEDICAID

## 2023-01-03 DIAGNOSIS — F90.0 ATTENTION DEFICIT HYPERACTIVITY DISORDER (ADHD), PREDOMINANTLY INATTENTIVE TYPE: ICD-10-CM

## 2023-01-03 PROCEDURE — 1160F RVW MEDS BY RX/DR IN RCRD: CPT | Mod: CPTII,95,, | Performed by: FAMILY MEDICINE

## 2023-01-03 PROCEDURE — 1160F PR REVIEW ALL MEDS BY PRESCRIBER/CLIN PHARMACIST DOCUMENTED: ICD-10-PCS | Mod: CPTII,95,, | Performed by: FAMILY MEDICINE

## 2023-01-03 PROCEDURE — 1159F MED LIST DOCD IN RCRD: CPT | Mod: CPTII,95,, | Performed by: FAMILY MEDICINE

## 2023-01-03 PROCEDURE — 99213 OFFICE O/P EST LOW 20 MIN: CPT | Mod: 95,,, | Performed by: FAMILY MEDICINE

## 2023-01-03 PROCEDURE — 99213 PR OFFICE/OUTPT VISIT, EST, LEVL III, 20-29 MIN: ICD-10-PCS | Mod: 95,,, | Performed by: FAMILY MEDICINE

## 2023-01-03 PROCEDURE — 1159F PR MEDICATION LIST DOCUMENTED IN MEDICAL RECORD: ICD-10-PCS | Mod: CPTII,95,, | Performed by: FAMILY MEDICINE

## 2023-01-03 RX ORDER — DEXTROAMPHETAMINE SACCHARATE, AMPHETAMINE ASPARTATE MONOHYDRATE, DEXTROAMPHETAMINE SULFATE AND AMPHETAMINE SULFATE 7.5; 7.5; 7.5; 7.5 MG/1; MG/1; MG/1; MG/1
30 CAPSULE, EXTENDED RELEASE ORAL EVERY MORNING
Qty: 30 CAPSULE | Refills: 0 | Status: SHIPPED | OUTPATIENT
Start: 2023-01-03 | End: 2023-08-30 | Stop reason: SDUPTHER

## 2023-01-03 RX ORDER — DEXTROAMPHETAMINE SACCHARATE, AMPHETAMINE ASPARTATE MONOHYDRATE, DEXTROAMPHETAMINE SULFATE AND AMPHETAMINE SULFATE 7.5; 7.5; 7.5; 7.5 MG/1; MG/1; MG/1; MG/1
30 CAPSULE, EXTENDED RELEASE ORAL EVERY MORNING
Qty: 30 CAPSULE | Refills: 0 | Status: SHIPPED | OUTPATIENT
Start: 2023-02-28 | End: 2024-02-29 | Stop reason: SDUPTHER

## 2023-01-03 RX ORDER — DEXTROAMPHETAMINE SACCHARATE, AMPHETAMINE ASPARTATE MONOHYDRATE, DEXTROAMPHETAMINE SULFATE AND AMPHETAMINE SULFATE 7.5; 7.5; 7.5; 7.5 MG/1; MG/1; MG/1; MG/1
30 CAPSULE, EXTENDED RELEASE ORAL EVERY MORNING
Qty: 30 CAPSULE | Refills: 0 | Status: SHIPPED | OUTPATIENT
Start: 2023-01-31 | End: 2024-02-29 | Stop reason: SDUPTHER

## 2023-01-03 NOTE — PROGRESS NOTES
TELEMEDICINE VIRTUAL VISIT  1/3/23  1:30 PM CST    Visit Details: This visit was a telemedicine virtual visit with synchronous audio and video. Alfie represented that her location at the time of this visit was in the [facility]. Alfie chose and consented to receive these medical services by telemedicine.    CHIEF COMPLAINT: Follow-up and Medication Refill    ASSESSMENT & PLAN  1. Attention deficit hyperactivity disorder (ADHD), predominantly inattentive type  Assessment & Plan:  Alfie reports that current treatment (Adderall-XR) is providing satisfactory relief of attention deficit disorder symptoms and helping compensate for deficits and improve level of functioning. Alfie appears to be tolerating current treatment well, reporting no mood instability, tics, disordered sleep, rapid heart rate, palpitations or other apparent adverse effects. I have observed no behaviors to suggest inappropriate use of prescribed medications. [place] Board [employer]acy Controlled Prescription Drug Monitoring database was queried and showed no activity to suggest abuse, diversion, or other inappropriate use of prescription medications.     Orders:  -     dextroamphetamine-amphetamine (ADDERALL XR) 30 MG 24 hr capsule; Take 1 capsule (30 mg total) by mouth every morning.  Dispense: 30 capsule; Refill: 0  -     dextroamphetamine-amphetamine (ADDERALL XR) 30 MG 24 hr capsule; Take 1 capsule (30 mg total) by mouth every morning.  Dispense: 30 capsule; Refill: 0  -     dextroamphetamine-amphetamine (ADDERALL XR) 30 MG 24 hr capsule; Take 1 capsule (30 mg total) by mouth every morning.  Dispense: 30 capsule; Refill: 0    Unless noted herein, any chronic conditions are represented as and appear compensated/controlled and stable, and no other significant complaints or concerns were reported.    Follow up in about 12 weeks (around 3/28/2023) for re-evaluate problem(s) discussed today.     PRESCRIPTION DRUG  MANAGEMENT  Outpatient Medications Prior to Visit   Medication Sig Dispense Refill    dextroamphetamine-amphetamine (ADDERALL XR) 30 MG 24 hr capsule Take 1 capsule (30 mg total) by mouth every morning. 30 capsule 0    dextroamphetamine-amphetamine (ADDERALL XR) 30 MG 24 hr capsule Take 1 capsule (30 mg total) by mouth every morning. 30 capsule 0    dextroamphetamine-amphetamine (ADDERALL XR) 30 MG 24 hr capsule Take 1 capsule (30 mg total) by mouth every morning. 30 capsule 0     No facility-administered medications prior to visit.     Medications Discontinued During This Encounter   Medication Reason    dextroamphetamine-amphetamine (ADDERALL XR) 30 MG 24 hr capsule Reorder    dextroamphetamine-amphetamine (ADDERALL XR) 30 MG 24 hr capsule Reorder    dextroamphetamine-amphetamine (ADDERALL XR) 30 MG 24 hr capsule Reorder     Medications Ordered This Encounter   Medications    dextroamphetamine-amphetamine (ADDERALL XR) 30 MG 24 hr capsule     Sig: Take 1 capsule (30 mg total) by mouth every morning.     Dispense:  30 capsule     Refill:  0     1 May fill on or after Tue, Jan 03, 2023.    dextroamphetamine-amphetamine (ADDERALL XR) 30 MG 24 hr capsule     Sig: Take 1 capsule (30 mg total) by mouth every morning.     Dispense:  30 capsule     Refill:  0     2 May fill on or after Tue, Jan 31, 2023.    dextroamphetamine-amphetamine (ADDERALL XR) 30 MG 24 hr capsule     Sig: Take 1 capsule (30 mg total) by mouth every morning.     Dispense:  30 capsule     Refill:  0     3 May fill on or after Tue, Feb 28, 2023.     Review of Systems   Constitutional:  Negative for activity change and unexpected weight change.   HENT:  Negative for hearing loss, rhinorrhea and trouble swallowing.    Eyes:  Negative for discharge and visual disturbance.   Respiratory:  Negative for chest tightness and wheezing.    Cardiovascular:  Negative for chest pain and palpitations.   Gastrointestinal:  Negative for blood in stool, constipation,  diarrhea and vomiting.   Endocrine: Negative for polydipsia and polyuria.   Genitourinary:  Negative for difficulty urinating, dysuria, hematuria and menstrual problem.   Musculoskeletal:  Negative for arthralgias, joint swelling and neck pain.   Neurological:  Negative for weakness and headaches.   Psychiatric/Behavioral:  Negative for confusion and dysphoric mood.    PHYSICAL EXAM  CONSTITUTIONAL: No apparent distress. Appears comfortable. Does not appear acutely ill or septic. Appears adequately hydrated.  PULMONARY: Breathing unlabored. No audible wheezes. Easily speaks in full sentences.  PSYCHIATRIC: Alert and conversant and grossly oriented. Mood is grossly neutral. Affect appropriate. Judgment and insight grossly intact.    Orders Placed This Encounter    dextroamphetamine-amphetamine (ADDERALL XR) 30 MG 24 hr capsule    dextroamphetamine-amphetamine (ADDERALL XR) 30 MG 24 hr capsule    dextroamphetamine-amphetamine (ADDERALL XR) 30 MG 24 hr capsule     TOTAL TIME evaluating and managing this patient for this encounter was greater than or equal to 12 minutes. This time was spent personally by me on the following activities: review of patient's past medical history, assessing age-appropriate health maintenance needs, review of any interval history, medication reconciliation, obtaining history from the patient, examination of the patient, evaluation of the patient's response to treatment, review of [facility] Controlled Prescription Drug Monitoring database records for the patient, managing and/or ordering prescription medications, educating patient and answering their questions about treatment plan, goals of treatment, and follow-up, and final documentation of the visit. This time was exclusive of any separately billable procedures for this patient and exclusive of time spent treating any other patients.    Documentation entered by me for this encounter may have been done in part using  "speech-recognition technology. Although I have made an effort to ensure accuracy, "sound like" errors may exist and should be interpreted in context.    Each patient to whom medical services are provided by telemedicine is: (1) informed of the relationship between the physician and patient and the respective role of any other health care provider with respect to management of the patient; and (2) notified that he or she may decline to receive medical services by telemedicine and may withdraw from such care at any time.   "

## 2023-03-11 ENCOUNTER — DOCUMENTATION ONLY (OUTPATIENT)
Dept: INTERNAL MEDICINE | Facility: CLINIC | Age: 33
End: 2023-03-11

## 2023-03-31 ENCOUNTER — OFFICE VISIT (OUTPATIENT)
Dept: INTERNAL MEDICINE | Facility: CLINIC | Age: 33
End: 2023-03-31
Payer: MEDICAID

## 2023-03-31 DIAGNOSIS — F90.0 ATTENTION DEFICIT HYPERACTIVITY DISORDER (ADHD), PREDOMINANTLY INATTENTIVE TYPE: Primary | Chronic | ICD-10-CM

## 2023-03-31 PROCEDURE — 1160F RVW MEDS BY RX/DR IN RCRD: CPT | Mod: CPTII,95,, | Performed by: PHYSICIAN ASSISTANT

## 2023-03-31 PROCEDURE — 99213 OFFICE O/P EST LOW 20 MIN: CPT | Mod: 95,,, | Performed by: PHYSICIAN ASSISTANT

## 2023-03-31 PROCEDURE — 1160F PR REVIEW ALL MEDS BY PRESCRIBER/CLIN PHARMACIST DOCUMENTED: ICD-10-PCS | Mod: CPTII,95,, | Performed by: PHYSICIAN ASSISTANT

## 2023-03-31 PROCEDURE — 99213 PR OFFICE/OUTPT VISIT, EST, LEVL III, 20-29 MIN: ICD-10-PCS | Mod: 95,,, | Performed by: PHYSICIAN ASSISTANT

## 2023-03-31 PROCEDURE — 1159F MED LIST DOCD IN RCRD: CPT | Mod: CPTII,95,, | Performed by: PHYSICIAN ASSISTANT

## 2023-03-31 PROCEDURE — 1159F PR MEDICATION LIST DOCUMENTED IN MEDICAL RECORD: ICD-10-PCS | Mod: CPTII,95,, | Performed by: PHYSICIAN ASSISTANT

## 2023-03-31 RX ORDER — DEXTROAMPHETAMINE SACCHARATE, AMPHETAMINE ASPARTATE MONOHYDRATE, DEXTROAMPHETAMINE SULFATE AND AMPHETAMINE SULFATE 7.5; 7.5; 7.5; 7.5 MG/1; MG/1; MG/1; MG/1
30 CAPSULE, EXTENDED RELEASE ORAL EVERY MORNING
Qty: 30 CAPSULE | Refills: 0 | Status: SHIPPED | OUTPATIENT
Start: 2023-05-20 | End: 2023-06-19

## 2023-03-31 RX ORDER — DEXTROAMPHETAMINE SACCHARATE, AMPHETAMINE ASPARTATE MONOHYDRATE, DEXTROAMPHETAMINE SULFATE AND AMPHETAMINE SULFATE 7.5; 7.5; 7.5; 7.5 MG/1; MG/1; MG/1; MG/1
30 CAPSULE, EXTENDED RELEASE ORAL EVERY MORNING
Qty: 30 CAPSULE | Refills: 0 | Status: SHIPPED | OUTPATIENT
Start: 2023-06-20 | End: 2023-07-20

## 2023-03-31 RX ORDER — DEXTROAMPHETAMINE SACCHARATE, AMPHETAMINE ASPARTATE MONOHYDRATE, DEXTROAMPHETAMINE SULFATE AND AMPHETAMINE SULFATE 7.5; 7.5; 7.5; 7.5 MG/1; MG/1; MG/1; MG/1
30 CAPSULE, EXTENDED RELEASE ORAL EVERY MORNING
Qty: 30 CAPSULE | Refills: 0 | Status: SHIPPED | OUTPATIENT
Start: 2023-04-20 | End: 2023-05-20

## 2023-03-31 NOTE — PROGRESS NOTES
The patient location is: [place], [place]  The chief complaint leading to consultation is: 3 month follow up    Visit type: audiovisual    Face to Face time with patient: 8 min  13 minutes of total time spent on the encounter, which includes face to face time and non-face to face time preparing to see the patient (eg, review of tests), Obtaining and/or reviewing separately obtained history, Documenting clinical information in the electronic or other health record, Independently interpreting results (not separately reported) and communicating results to the patient/family/caregiver, or Care coordination (not separately reported).         Each patient to whom he or she provides medical services by telemedicine is:  (1) informed of the relationship between the physician and patient and the respective role of any other health care provider with respect to management of the patient; and (2) notified that he or she may decline to receive medical services by telemedicine and may withdraw from such care at any time.    Notes:    Subjective:      Patient ID: Alfie Carroll is a 33 y.o. female.    Chief Complaint: No chief complaint on file.    HPI  Here today for a routine 3 month follow up for her ADHD medication.   Doing great on the adderall XR 30. No SE. No ticks, irritability or mood swings, insomnia, or palpitations.    will be checked today.     Patient Active Problem List   Diagnosis    Attention deficit hyperactivity disorder (ADHD), predominantly inattentive type    Overweight (BMI 25.0-29.9)    At increased risk for hospitalization and death from COVID-19 infection due to unvaccinated state         Current Outpatient Medications:     dextroamphetamine-amphetamine (ADDERALL XR) 30 MG 24 hr capsule, Take 1 capsule (30 mg total) by mouth every morning., Disp: 30 capsule, Rfl: 0    dextroamphetamine-amphetamine (ADDERALL XR) 30 MG 24 hr capsule, Take 1 capsule (30 mg total) by mouth every morning., Disp: 30 capsule,  Rfl: 0    dextroamphetamine-amphetamine (ADDERALL XR) 30 MG 24 hr capsule, Take 1 capsule (30 mg total) by mouth every morning., Disp: 30 capsule, Rfl: 0    [START ON 4/20/2023] dextroamphetamine-amphetamine (ADDERALL XR) 30 MG 24 hr capsule, Take 1 capsule (30 mg total) by mouth every morning., Disp: 30 capsule, Rfl: 0    [START ON 5/20/2023] dextroamphetamine-amphetamine (ADDERALL XR) 30 MG 24 hr capsule, Take 1 capsule (30 mg total) by mouth every morning., Disp: 30 capsule, Rfl: 0    [START ON 6/20/2023] dextroamphetamine-amphetamine (ADDERALL XR) 30 MG 24 hr capsule, Take 1 capsule (30 mg total) by mouth every morning., Disp: 30 capsule, Rfl: 0    Review of Systems   Constitutional:  Negative for activity change, appetite change, chills, diaphoresis, fatigue, fever and unexpected weight change.   HENT: Negative.  Negative for congestion, hearing loss, postnasal drip, rhinorrhea, sore throat, trouble swallowing and voice change.    Eyes: Negative.  Negative for discharge and visual disturbance.   Respiratory: Negative.  Negative for cough, choking, chest tightness, shortness of breath and wheezing.    Cardiovascular:  Negative for chest pain, palpitations and leg swelling.   Gastrointestinal:  Negative for abdominal distention, abdominal pain, blood in stool, constipation, diarrhea, nausea and vomiting.   Endocrine: Negative for cold intolerance, heat intolerance, polydipsia and polyuria.   Genitourinary: Negative.  Negative for difficulty urinating, dysuria, frequency, hematuria and menstrual problem.   Musculoskeletal:  Negative for arthralgias, back pain, gait problem, joint swelling, myalgias and neck pain.   Skin:  Negative for color change, pallor, rash and wound.   Neurological:  Negative for dizziness, tremors, weakness, light-headedness, numbness and headaches.   Hematological:  Negative for adenopathy.   Psychiatric/Behavioral:  Negative for behavioral problems, confusion, dysphoric mood, self-injury,  sleep disturbance and suicidal ideas. The patient is not nervous/anxious.    Objective:   There were no vitals taken for this visit.    Physical Exam  Constitutional:       General: She is not in acute distress.     Appearance: Normal appearance. She is not ill-appearing, toxic-appearing or diaphoretic.   HENT:      Head: Normocephalic and atraumatic.   Pulmonary:      Effort: Pulmonary effort is normal. No respiratory distress.      Breath sounds: Normal breath sounds.   Skin:     Coloration: Skin is not pale.      Findings: No bruising, erythema or rash.   Neurological:      Mental Status: She is alert and oriented to person, place, and time.   Psychiatric:         Mood and Affect: Mood normal.         Behavior: Behavior normal.         Thought Content: Thought content normal.         Judgment: Judgment normal.       Assessment:     1. Attention deficit hyperactivity disorder (ADHD), predominantly inattentive type      Plan:   Attention deficit hyperactivity disorder (ADHD), predominantly inattentive type  -     dextroamphetamine-amphetamine (ADDERALL XR) 30 MG 24 hr capsule; Take 1 capsule (30 mg total) by mouth every morning.  Dispense: 30 capsule; Refill: 0  -     dextroamphetamine-amphetamine (ADDERALL XR) 30 MG 24 hr capsule; Take 1 capsule (30 mg total) by mouth every morning.  Dispense: 30 capsule; Refill: 0  -     dextroamphetamine-amphetamine (ADDERALL XR) 30 MG 24 hr capsule; Take 1 capsule (30 mg total) by mouth every morning.  Dispense: 30 capsule; Refill: 0    - checkeed. Last rx filled 3/20/2023 so next will be due 4/20/2023  -no SE. Stable and controlled at current dose.    Follow up in about 3 months (around 6/30/2023), or if symptoms worsen or fail to improve, for Virtual Visit.

## 2023-04-19 ENCOUNTER — PATIENT MESSAGE (OUTPATIENT)
Dept: ADMINISTRATIVE | Facility: HOSPITAL | Age: 33
End: 2023-04-19
Payer: MEDICAID

## 2023-08-29 ENCOUNTER — TELEPHONE (OUTPATIENT)
Dept: INTERNAL MEDICINE | Facility: CLINIC | Age: 33
End: 2023-08-29
Payer: MEDICAID

## 2023-08-29 NOTE — TELEPHONE ENCOUNTER
----- Message from Maria Victoria Black sent at 8/29/2023 12:59 PM CDT -----  Contact: Pt [phone]  Would like to receive medical advice.    Would they like a call back or a response via [facility]: portal  '  Additional information:  Pt is calling to schedule an appt for medication refills.  There are no available appts for me to schedule.  Please send pt a message through portal.

## 2023-08-30 ENCOUNTER — OFFICE VISIT (OUTPATIENT)
Dept: INTERNAL MEDICINE | Facility: CLINIC | Age: 33
End: 2023-08-30
Payer: MEDICAID

## 2023-08-30 ENCOUNTER — LAB VISIT (OUTPATIENT)
Dept: LAB | Facility: HOSPITAL | Age: 33
End: 2023-08-30
Attending: PHYSICIAN ASSISTANT
Payer: MEDICAID

## 2023-08-30 VITALS
DIASTOLIC BLOOD PRESSURE: 74 MMHG | TEMPERATURE: 98 F | HEIGHT: 64 IN | HEART RATE: 102 BPM | WEIGHT: 164.25 LBS | OXYGEN SATURATION: 99 % | SYSTOLIC BLOOD PRESSURE: 110 MMHG | BODY MASS INDEX: 28.04 KG/M2

## 2023-08-30 DIAGNOSIS — Z00.00 ROUTINE HEALTH MAINTENANCE: ICD-10-CM

## 2023-08-30 DIAGNOSIS — F90.0 ATTENTION DEFICIT HYPERACTIVITY DISORDER (ADHD), PREDOMINANTLY INATTENTIVE TYPE: ICD-10-CM

## 2023-08-30 DIAGNOSIS — Z00.00 ROUTINE HEALTH MAINTENANCE: Primary | ICD-10-CM

## 2023-08-30 LAB
ALBUMIN SERPL BCP-MCNC: 4 G/DL (ref 3.5–5.2)
ALP SERPL-CCNC: 42 U/L (ref 55–135)
ALT SERPL W/O P-5'-P-CCNC: 18 U/L (ref 10–44)
ANION GAP SERPL CALC-SCNC: 7 MMOL/L (ref 8–16)
AST SERPL-CCNC: 19 U/L (ref 10–40)
BASOPHILS # BLD AUTO: 0.04 K/UL (ref 0–0.2)
BASOPHILS NFR BLD: 0.7 % (ref 0–1.9)
BILIRUB SERPL-MCNC: 0.4 MG/DL (ref 0.1–1)
BUN SERPL-MCNC: 9 MG/DL (ref 6–20)
CALCIUM SERPL-MCNC: 9.4 MG/DL (ref 8.7–10.5)
CHLORIDE SERPL-SCNC: 110 MMOL/L (ref 95–110)
CHOLEST SERPL-MCNC: 115 MG/DL (ref 120–199)
CHOLEST/HDLC SERPL: 2.3 {RATIO} (ref 2–5)
CO2 SERPL-SCNC: 23 MMOL/L (ref 23–29)
CREAT SERPL-MCNC: 0.8 MG/DL (ref 0.5–1.4)
DIFFERENTIAL METHOD: ABNORMAL
EOSINOPHIL # BLD AUTO: 0.2 K/UL (ref 0–0.5)
EOSINOPHIL NFR BLD: 3.4 % (ref 0–8)
ERYTHROCYTE [DISTWIDTH] IN BLOOD BY AUTOMATED COUNT: 12.3 % (ref 11.5–14.5)
EST. GFR  (NO RACE VARIABLE): >60 ML/MIN/1.73 M^2
GLUCOSE SERPL-MCNC: 81 MG/DL (ref 70–110)
HCT VFR BLD AUTO: 39.2 % (ref 37–48.5)
HDLC SERPL-MCNC: 50 MG/DL (ref 40–75)
HDLC SERPL: 43.5 % (ref 20–50)
HGB BLD-MCNC: 13 G/DL (ref 12–16)
IMM GRANULOCYTES # BLD AUTO: 0.01 K/UL (ref 0–0.04)
IMM GRANULOCYTES NFR BLD AUTO: 0.2 % (ref 0–0.5)
LDLC SERPL CALC-MCNC: 51.2 MG/DL (ref 63–159)
LYMPHOCYTES # BLD AUTO: 2.5 K/UL (ref 1–4.8)
LYMPHOCYTES NFR BLD: 44.8 % (ref 18–48)
MCH RBC QN AUTO: 31.6 PG (ref 27–31)
MCHC RBC AUTO-ENTMCNC: 33.2 G/DL (ref 32–36)
MCV RBC AUTO: 95 FL (ref 82–98)
MONOCYTES # BLD AUTO: 0.5 K/UL (ref 0.3–1)
MONOCYTES NFR BLD: 8.2 % (ref 4–15)
NEUTROPHILS # BLD AUTO: 2.4 K/UL (ref 1.8–7.7)
NEUTROPHILS NFR BLD: 42.7 % (ref 38–73)
NONHDLC SERPL-MCNC: 65 MG/DL
NRBC BLD-RTO: 0 /100 WBC
PLATELET # BLD AUTO: 306 K/UL (ref 150–450)
PMV BLD AUTO: 10.8 FL (ref 9.2–12.9)
POTASSIUM SERPL-SCNC: 4.5 MMOL/L (ref 3.5–5.1)
PROT SERPL-MCNC: 6.9 G/DL (ref 6–8.4)
RBC # BLD AUTO: 4.11 M/UL (ref 4–5.4)
SODIUM SERPL-SCNC: 140 MMOL/L (ref 136–145)
TRIGL SERPL-MCNC: 69 MG/DL (ref 30–150)
WBC # BLD AUTO: 5.63 K/UL (ref 3.9–12.7)

## 2023-08-30 PROCEDURE — 99214 OFFICE O/P EST MOD 30 MIN: CPT | Mod: PBBFAC | Performed by: PHYSICIAN ASSISTANT

## 2023-08-30 PROCEDURE — 99999 PR PBB SHADOW E&M-EST. PATIENT-LVL IV: CPT | Mod: PBBFAC,,, | Performed by: PHYSICIAN ASSISTANT

## 2023-08-30 PROCEDURE — 80053 COMPREHEN METABOLIC PANEL: CPT | Performed by: PHYSICIAN ASSISTANT

## 2023-08-30 PROCEDURE — 1160F PR REVIEW ALL MEDS BY PRESCRIBER/CLIN PHARMACIST DOCUMENTED: ICD-10-PCS | Mod: CPTII,,, | Performed by: PHYSICIAN ASSISTANT

## 2023-08-30 PROCEDURE — 99214 OFFICE O/P EST MOD 30 MIN: CPT | Mod: S$PBB,,, | Performed by: PHYSICIAN ASSISTANT

## 2023-08-30 PROCEDURE — 80061 LIPID PANEL: CPT | Performed by: PHYSICIAN ASSISTANT

## 2023-08-30 PROCEDURE — 3008F PR BODY MASS INDEX (BMI) DOCUMENTED: ICD-10-PCS | Mod: CPTII,,, | Performed by: PHYSICIAN ASSISTANT

## 2023-08-30 PROCEDURE — 99999 PR PBB SHADOW E&M-EST. PATIENT-LVL IV: ICD-10-PCS | Mod: PBBFAC,,, | Performed by: PHYSICIAN ASSISTANT

## 2023-08-30 PROCEDURE — 3008F BODY MASS INDEX DOCD: CPT | Mod: CPTII,,, | Performed by: PHYSICIAN ASSISTANT

## 2023-08-30 PROCEDURE — 1160F RVW MEDS BY RX/DR IN RCRD: CPT | Mod: CPTII,,, | Performed by: PHYSICIAN ASSISTANT

## 2023-08-30 PROCEDURE — 1159F MED LIST DOCD IN RCRD: CPT | Mod: CPTII,,, | Performed by: PHYSICIAN ASSISTANT

## 2023-08-30 PROCEDURE — 3074F PR MOST RECENT SYSTOLIC BLOOD PRESSURE < 130 MM HG: ICD-10-PCS | Mod: CPTII,,, | Performed by: PHYSICIAN ASSISTANT

## 2023-08-30 PROCEDURE — 3074F SYST BP LT 130 MM HG: CPT | Mod: CPTII,,, | Performed by: PHYSICIAN ASSISTANT

## 2023-08-30 PROCEDURE — 36415 COLL VENOUS BLD VENIPUNCTURE: CPT | Performed by: PHYSICIAN ASSISTANT

## 2023-08-30 PROCEDURE — 3078F PR MOST RECENT DIASTOLIC BLOOD PRESSURE < 80 MM HG: ICD-10-PCS | Mod: CPTII,,, | Performed by: PHYSICIAN ASSISTANT

## 2023-08-30 PROCEDURE — 99214 PR OFFICE/OUTPT VISIT, EST, LEVL IV, 30-39 MIN: ICD-10-PCS | Mod: S$PBB,,, | Performed by: PHYSICIAN ASSISTANT

## 2023-08-30 PROCEDURE — 3078F DIAST BP <80 MM HG: CPT | Mod: CPTII,,, | Performed by: PHYSICIAN ASSISTANT

## 2023-08-30 PROCEDURE — 85025 COMPLETE CBC W/AUTO DIFF WBC: CPT | Performed by: PHYSICIAN ASSISTANT

## 2023-08-30 PROCEDURE — 1159F PR MEDICATION LIST DOCUMENTED IN MEDICAL RECORD: ICD-10-PCS | Mod: CPTII,,, | Performed by: PHYSICIAN ASSISTANT

## 2023-08-30 RX ORDER — DEXTROAMPHETAMINE SACCHARATE, AMPHETAMINE ASPARTATE MONOHYDRATE, DEXTROAMPHETAMINE SULFATE AND AMPHETAMINE SULFATE 7.5; 7.5; 7.5; 7.5 MG/1; MG/1; MG/1; MG/1
30 CAPSULE, EXTENDED RELEASE ORAL EVERY MORNING
Qty: 30 CAPSULE | Refills: 0 | Status: SHIPPED | OUTPATIENT
Start: 2023-08-30 | End: 2023-11-29 | Stop reason: SDUPTHER

## 2023-08-30 RX ORDER — DEXTROAMPHETAMINE SACCHARATE, AMPHETAMINE ASPARTATE MONOHYDRATE, DEXTROAMPHETAMINE SULFATE AND AMPHETAMINE SULFATE 7.5; 7.5; 7.5; 7.5 MG/1; MG/1; MG/1; MG/1
30 CAPSULE, EXTENDED RELEASE ORAL EVERY MORNING
Qty: 30 CAPSULE | Refills: 0 | Status: SHIPPED | OUTPATIENT
Start: 2023-10-30 | End: 2023-11-29

## 2023-08-30 RX ORDER — DEXTROAMPHETAMINE SACCHARATE, AMPHETAMINE ASPARTATE MONOHYDRATE, DEXTROAMPHETAMINE SULFATE AND AMPHETAMINE SULFATE 7.5; 7.5; 7.5; 7.5 MG/1; MG/1; MG/1; MG/1
30 CAPSULE, EXTENDED RELEASE ORAL EVERY MORNING
Qty: 30 CAPSULE | Refills: 0 | Status: SHIPPED | OUTPATIENT
Start: 2023-09-30 | End: 2023-10-30

## 2023-08-30 NOTE — PROGRESS NOTES
Subjective:      Patient ID: Alfie Carroll is a 33 y.o. female.    Chief Complaint: Medication Refill    HPI  Here today for a refill on her adderall XR 30. Doing well without any SE. No insomnia, mood swings, heart palpitations, elevated blood pressure.   Otherwise doing well.   NO chest pain, shortness of breath, changes to bowel movements.  Mood is good.   Will schedule GYN follow up for PAP.    Patient Active Problem List   Diagnosis    Attention deficit hyperactivity disorder (ADHD), predominantly inattentive type    Overweight (BMI 25.0-29.9)    At increased risk for hospitalization and death from COVID-19 infection due to unvaccinated state         Current Outpatient Medications:     dextroamphetamine-amphetamine (ADDERALL XR) 30 MG 24 hr capsule, Take 1 capsule (30 mg total) by mouth every morning., Disp: 30 capsule, Rfl: 0    dextroamphetamine-amphetamine (ADDERALL XR) 30 MG 24 hr capsule, Take 1 capsule (30 mg total) by mouth every morning., Disp: 30 capsule, Rfl: 0    dextroamphetamine-amphetamine (ADDERALL XR) 30 MG 24 hr capsule, Take 1 capsule (30 mg total) by mouth every morning., Disp: 30 capsule, Rfl: 0    [START ON 10/30/2023] dextroamphetamine-amphetamine (ADDERALL XR) 30 MG 24 hr capsule, Take 1 capsule (30 mg total) by mouth every morning., Disp: 30 capsule, Rfl: 0    [START ON 9/30/2023] dextroamphetamine-amphetamine (ADDERALL XR) 30 MG 24 hr capsule, Take 1 capsule (30 mg total) by mouth every morning., Disp: 30 capsule, Rfl: 0    Review of Systems   Constitutional:  Negative for activity change, appetite change, chills, diaphoresis, fatigue, fever and unexpected weight change.   HENT: Negative.  Negative for congestion, hearing loss, postnasal drip, rhinorrhea, sore throat, trouble swallowing and voice change.    Eyes: Negative.  Negative for visual disturbance.   Respiratory: Negative.  Negative for cough, choking, chest tightness and shortness of breath.    Cardiovascular:  Negative for  "chest pain, palpitations and leg swelling.   Gastrointestinal:  Negative for abdominal distention, abdominal pain, blood in stool, constipation, diarrhea, nausea and vomiting.   Endocrine: Negative for cold intolerance, heat intolerance, polydipsia and polyuria.   Genitourinary: Negative.  Negative for difficulty urinating and frequency.   Musculoskeletal:  Negative for arthralgias, back pain, gait problem, joint swelling and myalgias.   Skin:  Negative for color change, pallor, rash and wound.   Neurological:  Negative for dizziness, tremors, weakness, light-headedness, numbness and headaches.   Hematological:  Negative for adenopathy.   Psychiatric/Behavioral:  Negative for behavioral problems, confusion, self-injury, sleep disturbance and suicidal ideas. The patient is not nervous/anxious.      Objective:   /74 (BP Location: Left arm, Patient Position: Sitting, BP Method: Large (Manual))   Pulse 102   Temp 97.8 °F (36.6 °C) (Tympanic)   Ht 5' 4" (1.626 m)   Wt 74.5 kg (164 lb 3.9 oz)   SpO2 99%   BMI 28.19 kg/m²     Physical Exam  Vitals reviewed.   Constitutional:       General: She is not in acute distress.     Appearance: Normal appearance. She is well-developed. She is not ill-appearing, toxic-appearing or diaphoretic.   HENT:      Head: Normocephalic and atraumatic.      Right Ear: Tympanic membrane, ear canal and external ear normal.      Left Ear: Tympanic membrane, ear canal and external ear normal.      Nose: Nose normal.      Mouth/Throat:      Mouth: Mucous membranes are moist.      Pharynx: No oropharyngeal exudate or posterior oropharyngeal erythema.   Eyes:      Conjunctiva/sclera: Conjunctivae normal.      Pupils: Pupils are equal, round, and reactive to light.   Cardiovascular:      Rate and Rhythm: Normal rate and regular rhythm.      Heart sounds: Normal heart sounds. No murmur heard.     No friction rub. No gallop.   Pulmonary:      Effort: Pulmonary effort is normal. No respiratory " distress.      Breath sounds: Normal breath sounds. No wheezing or rales.   Chest:      Chest wall: No tenderness.   Abdominal:      General: There is no distension.      Palpations: Abdomen is soft.      Tenderness: There is no abdominal tenderness.   Musculoskeletal:         General: Normal range of motion.      Cervical back: Normal range of motion and neck supple.   Lymphadenopathy:      Cervical: No cervical adenopathy.   Skin:     General: Skin is warm and dry.      Capillary Refill: Capillary refill takes less than 2 seconds.      Findings: No rash.   Neurological:      Mental Status: She is alert and oriented to person, place, and time.      Motor: No weakness.      Coordination: Coordination normal.      Gait: Gait normal.   Psychiatric:         Mood and Affect: Mood normal.         Behavior: Behavior normal.         Thought Content: Thought content normal.         Judgment: Judgment normal.         Assessment:     1. Routine health maintenance    2. Attention deficit hyperactivity disorder (ADHD), predominantly inattentive type      Plan:   Routine health maintenance  -     CBC Auto Differential; Future; Expected date: 08/30/2023  -     Comprehensive Metabolic Panel; Future  -     Lipid Panel; Future; Expected date: 08/30/2023    Attention deficit hyperactivity disorder (ADHD), predominantly inattentive type  -     dextroamphetamine-amphetamine (ADDERALL XR) 30 MG 24 hr capsule; Take 1 capsule (30 mg total) by mouth every morning.  Dispense: 30 capsule; Refill: 0  -     dextroamphetamine-amphetamine (ADDERALL XR) 30 MG 24 hr capsule; Take 1 capsule (30 mg total) by mouth every morning.  Dispense: 30 capsule; Refill: 0  -     dextroamphetamine-amphetamine (ADDERALL XR) 30 MG 24 hr capsule; Take 1 capsule (30 mg total) by mouth every morning.  Dispense: 30 capsule; Refill: 0    - checked and appropriate.   -Pt adhd symptoms stable and controlled on adderall at current dose withuot any adverse effects.    -annual labs today  - in 3 months virtual    Follow up in about 3 months (around 11/30/2023), or if symptoms worsen or fail to improve, for Virtual Visit.

## 2023-11-29 ENCOUNTER — OFFICE VISIT (OUTPATIENT)
Dept: INTERNAL MEDICINE | Facility: CLINIC | Age: 33
End: 2023-11-29
Payer: MEDICAID

## 2023-11-29 DIAGNOSIS — F90.0 ATTENTION DEFICIT HYPERACTIVITY DISORDER (ADHD), PREDOMINANTLY INATTENTIVE TYPE: Primary | Chronic | ICD-10-CM

## 2023-11-29 DIAGNOSIS — Z00.00 ROUTINE HEALTH MAINTENANCE: ICD-10-CM

## 2023-11-29 PROCEDURE — 1160F RVW MEDS BY RX/DR IN RCRD: CPT | Mod: CPTII,95,, | Performed by: PHYSICIAN ASSISTANT

## 2023-11-29 PROCEDURE — 1159F MED LIST DOCD IN RCRD: CPT | Mod: CPTII,95,, | Performed by: PHYSICIAN ASSISTANT

## 2023-11-29 PROCEDURE — 1159F PR MEDICATION LIST DOCUMENTED IN MEDICAL RECORD: ICD-10-PCS | Mod: CPTII,95,, | Performed by: PHYSICIAN ASSISTANT

## 2023-11-29 PROCEDURE — 99213 PR OFFICE/OUTPT VISIT, EST, LEVL III, 20-29 MIN: ICD-10-PCS | Mod: 95,,, | Performed by: PHYSICIAN ASSISTANT

## 2023-11-29 PROCEDURE — 1160F PR REVIEW ALL MEDS BY PRESCRIBER/CLIN PHARMACIST DOCUMENTED: ICD-10-PCS | Mod: CPTII,95,, | Performed by: PHYSICIAN ASSISTANT

## 2023-11-29 PROCEDURE — 99213 OFFICE O/P EST LOW 20 MIN: CPT | Mod: 95,,, | Performed by: PHYSICIAN ASSISTANT

## 2023-11-29 RX ORDER — DEXTROAMPHETAMINE SACCHARATE, AMPHETAMINE ASPARTATE MONOHYDRATE, DEXTROAMPHETAMINE SULFATE AND AMPHETAMINE SULFATE 7.5; 7.5; 7.5; 7.5 MG/1; MG/1; MG/1; MG/1
30 CAPSULE, EXTENDED RELEASE ORAL EVERY MORNING
Qty: 30 CAPSULE | Refills: 0 | Status: SHIPPED | OUTPATIENT
Start: 2023-12-29 | End: 2024-01-28

## 2023-11-29 RX ORDER — DEXTROAMPHETAMINE SACCHARATE, AMPHETAMINE ASPARTATE MONOHYDRATE, DEXTROAMPHETAMINE SULFATE AND AMPHETAMINE SULFATE 7.5; 7.5; 7.5; 7.5 MG/1; MG/1; MG/1; MG/1
30 CAPSULE, EXTENDED RELEASE ORAL EVERY MORNING
Qty: 30 CAPSULE | Refills: 0 | Status: SHIPPED | OUTPATIENT
Start: 2023-11-29 | End: 2024-02-29 | Stop reason: SDUPTHER

## 2023-11-29 RX ORDER — DEXTROAMPHETAMINE SACCHARATE, AMPHETAMINE ASPARTATE MONOHYDRATE, DEXTROAMPHETAMINE SULFATE AND AMPHETAMINE SULFATE 7.5; 7.5; 7.5; 7.5 MG/1; MG/1; MG/1; MG/1
30 CAPSULE, EXTENDED RELEASE ORAL EVERY MORNING
Qty: 30 CAPSULE | Refills: 0 | Status: SHIPPED | OUTPATIENT
Start: 2024-01-29 | End: 2024-02-29 | Stop reason: SDUPTHER

## 2023-11-29 NOTE — PROGRESS NOTES
The patient location is: [place], [place]  The chief complaint leading to consultation is: refill    Visit type: audiovisual    Face to Face time with patient: 9  11 minutes of total time spent on the encounter, which includes face to face time and non-face to face time preparing to see the patient (eg, review of tests), Obtaining and/or reviewing separately obtained history, Documenting clinical information in the electronic or other health record, Independently interpreting results (not separately reported) and communicating results to the patient/family/caregiver, or Care coordination (not separately reported).         Each patient to whom he or she provides medical services by telemedicine is:  (1) informed of the relationship between the physician and patient and the respective role of any other health care provider with respect to management of the patient; and (2) notified that he or she may decline to receive medical services by telemedicine and may withdraw from such care at any time.    Notes:    Subjective:      Patient ID: Alfie Carroll is a 33 y.o. female.    Chief Complaint: Follow-up    Follow-up  Pertinent negatives include no abdominal pain, arthralgias, chest pain, chills, congestion, coughing, diaphoresis, fatigue, fever, headaches, joint swelling, myalgias, nausea, numbness, rash, sore throat, vomiting or weakness.     Here today for a refill on her adderall XR 30. Doing well without any SE. No insomnia, mood swings, heart palpitations, elevated blood pressure.   Otherwise doing well.   NO chest pain, shortness of breath, changes to bowel movements.  Mood is good.   Needs to schedule PAP.   Recent labs will be reviewed.     Patient Active Problem List   Diagnosis    Attention deficit hyperactivity disorder (ADHD), predominantly inattentive type    Overweight (BMI 25.0-29.9)    At increased risk for hospitalization and death from COVID-19 infection due to unvaccinated state         Current  Outpatient Medications:     dextroamphetamine-amphetamine (ADDERALL XR) 30 MG 24 hr capsule, Take 1 capsule (30 mg total) by mouth every morning., Disp: 30 capsule, Rfl: 0    dextroamphetamine-amphetamine (ADDERALL XR) 30 MG 24 hr capsule, Take 1 capsule (30 mg total) by mouth every morning., Disp: 30 capsule, Rfl: 0    dextroamphetamine-amphetamine (ADDERALL XR) 30 MG 24 hr capsule, Take 1 capsule (30 mg total) by mouth every morning., Disp: 30 capsule, Rfl: 0    dextroamphetamine-amphetamine (ADDERALL XR) 30 MG 24 hr capsule, Take 1 capsule (30 mg total) by mouth every morning., Disp: 30 capsule, Rfl: 0    [START ON 12/29/2023] dextroamphetamine-amphetamine (ADDERALL XR) 30 MG 24 hr capsule, Take 1 capsule (30 mg total) by mouth every morning., Disp: 30 capsule, Rfl: 0    [START ON 1/29/2024] dextroamphetamine-amphetamine (ADDERALL XR) 30 MG 24 hr capsule, Take 1 capsule (30 mg total) by mouth every morning., Disp: 30 capsule, Rfl: 0    Review of Systems   Constitutional:  Negative for activity change, appetite change, chills, diaphoresis, fatigue, fever and unexpected weight change.   HENT: Negative.  Negative for congestion, hearing loss, postnasal drip, rhinorrhea, sore throat, trouble swallowing and voice change.    Eyes: Negative.  Negative for visual disturbance.   Respiratory: Negative.  Negative for cough, choking, chest tightness and shortness of breath.    Cardiovascular:  Negative for chest pain, palpitations and leg swelling.   Gastrointestinal:  Negative for abdominal distention, abdominal pain, blood in stool, constipation, diarrhea, nausea and vomiting.   Endocrine: Negative for cold intolerance, heat intolerance, polydipsia and polyuria.   Genitourinary: Negative.  Negative for difficulty urinating and frequency.   Musculoskeletal:  Negative for arthralgias, back pain, gait problem, joint swelling and myalgias.   Skin:  Negative for color change, pallor, rash and wound.   Neurological:  Negative  for dizziness, tremors, weakness, light-headedness, numbness and headaches.   Hematological:  Negative for adenopathy.   Psychiatric/Behavioral:  Negative for behavioral problems, confusion, self-injury, sleep disturbance and suicidal ideas. The patient is not nervous/anxious.      Objective:   There were no vitals taken for this visit.    Physical Exam  Constitutional:       General: She is not in acute distress.     Appearance: Normal appearance. She is not ill-appearing, toxic-appearing or diaphoretic.   HENT:      Head: Normocephalic and atraumatic.   Pulmonary:      Effort: Pulmonary effort is normal. No respiratory distress.      Breath sounds: Normal breath sounds.   Skin:     Coloration: Skin is not pale.      Findings: No bruising, erythema or rash.   Neurological:      Mental Status: She is alert and oriented to person, place, and time.   Psychiatric:         Mood and Affect: Mood normal.         Behavior: Behavior normal.         Thought Content: Thought content normal.         Judgment: Judgment normal.       Lab Visit on 08/30/2023   Component Date Value Ref Range Status    WBC 08/30/2023 5.63  3.90 - 12.70 K/uL Final    RBC 08/30/2023 4.11  4.00 - 5.40 M/uL Final    Hemoglobin 08/30/2023 13.0  12.0 - 16.0 g/dL Final    Hematocrit 08/30/2023 39.2  37.0 - 48.5 % Final    MCV 08/30/2023 95  82 - 98 fL Final    MCH 08/30/2023 31.6 (H)  27.0 - 31.0 pg Final    MCHC 08/30/2023 33.2  32.0 - 36.0 g/dL Final    RDW 08/30/2023 12.3  11.5 - 14.5 % Final    Platelets 08/30/2023 306  150 - 450 K/uL Final    MPV 08/30/2023 10.8  9.2 - 12.9 fL Final    Immature Granulocytes 08/30/2023 0.2  0.0 - 0.5 % Final    Gran # (ANC) 08/30/2023 2.4  1.8 - 7.7 K/uL Final    Immature Grans (Abs) 08/30/2023 0.01  0.00 - 0.04 K/uL Final    Comment: Mild elevation in immature granulocytes is non specific and   can be seen in a variety of conditions including stress response,   acute inflammation, trauma and pregnancy. Correlation  with other   laboratory and clinical findings is essential.      Lymph # 08/30/2023 2.5  1.0 - 4.8 K/uL Final    Mono # 08/30/2023 0.5  0.3 - 1.0 K/uL Final    Eos # 08/30/2023 0.2  0.0 - 0.5 K/uL Final    Baso # 08/30/2023 0.04  0.00 - 0.20 K/uL Final    nRBC 08/30/2023 0  0 /100 WBC Final    Gran % 08/30/2023 42.7  38.0 - 73.0 % Final    Lymph % 08/30/2023 44.8  18.0 - 48.0 % Final    Mono % 08/30/2023 8.2  4.0 - 15.0 % Final    Eosinophil % 08/30/2023 3.4  0.0 - 8.0 % Final    Basophil % 08/30/2023 0.7  0.0 - 1.9 % Final    Differential Method 08/30/2023 Automated   Final    Sodium 08/30/2023 140  136 - 145 mmol/L Final    Potassium 08/30/2023 4.5  3.5 - 5.1 mmol/L Final    Chloride 08/30/2023 110  95 - 110 mmol/L Final    CO2 08/30/2023 23  23 - 29 mmol/L Final    Glucose 08/30/2023 81  70 - 110 mg/dL Final    BUN 08/30/2023 9  6 - 20 mg/dL Final    Creatinine 08/30/2023 0.8  0.5 - 1.4 mg/dL Final    Calcium 08/30/2023 9.4  8.7 - 10.5 mg/dL Final    Total Protein 08/30/2023 6.9  6.0 - 8.4 g/dL Final    Albumin 08/30/2023 4.0  3.5 - 5.2 g/dL Final    Total Bilirubin 08/30/2023 0.4  0.1 - 1.0 mg/dL Final    Comment: For infants and newborns, interpretation of results should be based  on gestational age, weight and in agreement with clinical  observations.    Premature Infant recommended reference ranges:  Up to 24 hours.............<8.0 mg/dL  Up to 48 hours............<12.0 mg/dL  3-5 days..................<15.0 mg/dL  6-29 days.................<15.0 mg/dL      Alkaline Phosphatase 08/30/2023 42 (L)  55 - 135 U/L Final    AST 08/30/2023 19  10 - 40 U/L Final    ALT 08/30/2023 18  10 - 44 U/L Final    eGFR 08/30/2023 >60.0  >60 mL/min/1.73 m^2 Final    Anion Gap 08/30/2023 7 (L)  8 - 16 mmol/L Final    Cholesterol 08/30/2023 115 (L)  120 - 199 mg/dL Final    Comment: The National Cholesterol Education Program (NCEP) has set the  following guidelines (reference ranges) for  Cholesterol:  Optimal.....................<200 mg/dL  Borderline High.............200-239 mg/dL  High........................> or = 240 mg/dL      Triglycerides 08/30/2023 69  30 - 150 mg/dL Final    Comment: The National Cholesterol Education Program (NCEP) has set the  following guidelines (reference values) for triglycerides:  Normal......................<150 mg/dL  Borderline High.............150-199 mg/dL  High........................200-499 mg/dL      HDL 08/30/2023 50  40 - 75 mg/dL Final    Comment: The National Cholesterol Education Program (NCEP) has set the  following guidelines (reference values) for HDL Cholesterol:  Low...............<40 mg/dL  Optimal...........>60 mg/dL      LDL Cholesterol 08/30/2023 51.2 (L)  63.0 - 159.0 mg/dL Final    Comment: The National Cholesterol Education Program (NCEP) has set the  following guidelines (reference values) for LDL Cholesterol:  Optimal.......................<130 mg/dL  Borderline High...............130-159 mg/dL  High..........................160-189 mg/dL  Very High.....................>190 mg/dL      HDL/Cholesterol Ratio 08/30/2023 43.5  20.0 - 50.0 % Final    Total Cholesterol/HDL Ratio 08/30/2023 2.3  2.0 - 5.0 Final    Non-HDL Cholesterol 08/30/2023 65  mg/dL Final    Comment: Risk category and Non-HDL cholesterol goals:  Coronary heart disease (CHD)or equivalent (10-year risk of CHD >20%):  Non-HDL cholesterol goal     <130 mg/dL  Two or more CHD risk factors and 10-year risk of CHD <= 20%:  Non-HDL cholesterol goal     <160 mg/dL  0 to 1 CHD risk factor:  Non-HDL cholesterol goal     <190 mg/dL         Assessment:     1. Attention deficit hyperactivity disorder (ADHD), predominantly inattentive type    2. Routine health maintenance      Plan:   Attention deficit hyperactivity disorder (ADHD), predominantly inattentive type  -     dextroamphetamine-amphetamine (ADDERALL XR) 30 MG 24 hr capsule; Take 1 capsule (30 mg total) by mouth every morning.   Dispense: 30 capsule; Refill: 0  -     dextroamphetamine-amphetamine (ADDERALL XR) 30 MG 24 hr capsule; Take 1 capsule (30 mg total) by mouth every morning.  Dispense: 30 capsule; Refill: 0  -     dextroamphetamine-amphetamine (ADDERALL XR) 30 MG 24 hr capsule; Take 1 capsule (30 mg total) by mouth every morning.  Dispense: 30 capsule; Refill: 0    Routine health maintenance  -     Ambulatory referral/consult to Gynecology; Future; Expected date: 12/06/2023    - checked and appropriate.   -labs look good.    Follow up in about 3 months (around 2/29/2024), or if symptoms worsen or fail to improve, for Virtual Visit.

## 2024-02-29 ENCOUNTER — TELEPHONE (OUTPATIENT)
Dept: INTERNAL MEDICINE | Facility: CLINIC | Age: 34
End: 2024-02-29

## 2024-02-29 ENCOUNTER — OFFICE VISIT (OUTPATIENT)
Dept: INTERNAL MEDICINE | Facility: CLINIC | Age: 34
End: 2024-02-29
Payer: MEDICAID

## 2024-02-29 DIAGNOSIS — F90.0 ATTENTION DEFICIT HYPERACTIVITY DISORDER (ADHD), PREDOMINANTLY INATTENTIVE TYPE: ICD-10-CM

## 2024-02-29 PROCEDURE — 1160F RVW MEDS BY RX/DR IN RCRD: CPT | Mod: CPTII,95,, | Performed by: FAMILY MEDICINE

## 2024-02-29 PROCEDURE — 1159F MED LIST DOCD IN RCRD: CPT | Mod: CPTII,95,, | Performed by: FAMILY MEDICINE

## 2024-02-29 PROCEDURE — 99213 OFFICE O/P EST LOW 20 MIN: CPT | Mod: 95,,, | Performed by: FAMILY MEDICINE

## 2024-02-29 RX ORDER — DEXTROAMPHETAMINE SACCHARATE, AMPHETAMINE ASPARTATE MONOHYDRATE, DEXTROAMPHETAMINE SULFATE AND AMPHETAMINE SULFATE 7.5; 7.5; 7.5; 7.5 MG/1; MG/1; MG/1; MG/1
30 CAPSULE, EXTENDED RELEASE ORAL EVERY MORNING
Qty: 30 CAPSULE | Refills: 0 | Status: SHIPPED | OUTPATIENT
Start: 2024-03-28 | End: 2024-05-30 | Stop reason: SDUPTHER

## 2024-02-29 RX ORDER — DEXTROAMPHETAMINE SACCHARATE, AMPHETAMINE ASPARTATE MONOHYDRATE, DEXTROAMPHETAMINE SULFATE AND AMPHETAMINE SULFATE 7.5; 7.5; 7.5; 7.5 MG/1; MG/1; MG/1; MG/1
30 CAPSULE, EXTENDED RELEASE ORAL EVERY MORNING
Qty: 30 CAPSULE | Refills: 0 | Status: SHIPPED | OUTPATIENT
Start: 2024-02-29 | End: 2024-05-30 | Stop reason: SDUPTHER

## 2024-02-29 RX ORDER — DEXTROAMPHETAMINE SACCHARATE, AMPHETAMINE ASPARTATE MONOHYDRATE, DEXTROAMPHETAMINE SULFATE AND AMPHETAMINE SULFATE 7.5; 7.5; 7.5; 7.5 MG/1; MG/1; MG/1; MG/1
30 CAPSULE, EXTENDED RELEASE ORAL EVERY MORNING
Qty: 30 CAPSULE | Refills: 0 | Status: SHIPPED | OUTPATIENT
Start: 2024-04-25 | End: 2024-05-30 | Stop reason: SDUPTHER

## 2024-02-29 NOTE — PROGRESS NOTES
TELEMEDICINE VIRTUAL VIDEO VISIT  2/29/24  8:20 AM CST    Visit Type: Audiovisual    Patient's Location: Alfie represents that they are located within the state [facility].    History of Present Illness    Alfie presents today for review of current medications and discuss refills.    She is taking Adderall XR 30 mg daily, finding it effective without side effects. She reports no difficulties in obtaining the medicine.  ROS:  ROS is negative unless otherwise indicated in HPI.       Review of Systems   Constitutional:  Negative for activity change and unexpected weight change.   HENT:  Negative for hearing loss, rhinorrhea and trouble swallowing.    Eyes:  Negative for discharge and visual disturbance.   Respiratory:  Negative for chest tightness and wheezing.    Cardiovascular:  Negative for chest pain and palpitations.   Gastrointestinal:  Negative for blood in stool, constipation, diarrhea and vomiting.   Endocrine: Negative for polydipsia and polyuria.   Genitourinary:  Negative for difficulty urinating, dysuria, hematuria and menstrual problem.   Musculoskeletal:  Negative for arthralgias, joint swelling and neck pain.   Neurological:  Negative for weakness and headaches.   Psychiatric/Behavioral:  Negative for confusion and dysphoric mood.        Assessment & Plan    ADHD MANAGEMENT:   Continue the patient's current medication regimen, prescribing Adderall XR at a dosage of 30 mg daily due to its effectiveness and absence of side effects.   Instructed the patient on the medication refill process, emphasizing the need to request each subsequent refill from the pharmacist.  SCHEDULED FOLLOW-UPS:   Scheduled a virtual follow up in 3 months and an in-office visit in 6 months for the patient's annual wellness visit.   Advised the patient to reach out to the medical staff if they do not receive the schedule by Monday.   Provided guidance on how to reschedule appointments if necessary.       1. Attention  "deficit hyperactivity disorder (ADHD), predominantly inattentive type  -     dextroamphetamine-amphetamine (ADDERALL XR) 30 MG 24 hr capsule; Take 1 capsule (30 mg total) by mouth every morning.  Dispense: 30 capsule; Refill: 0  -     dextroamphetamine-amphetamine (ADDERALL XR) 30 MG 24 hr capsule; Take 1 capsule (30 mg total) by mouth every morning.  Dispense: 30 capsule; Refill: 0  -     dextroamphetamine-amphetamine (ADDERALL XR) 30 MG 24 hr capsule; Take 1 capsule (30 mg total) by mouth every morning.  Dispense: 30 capsule; Refill: 0       Unless noted herein, any chronic conditions are represented as and appear stable, and no other significant complaints or concerns were reported.    There were no vitals filed for this visit.  Physical Exam    Constitutional: No acute distress. Normal appearance. Not ill-appearing.  Pulmonary: Pulmonary effort is normal. No respiratory distress.  Skin: Skin is not jaundiced.  Neurological: Alert. Mental status is at baseline.  Psychiatric: Mood normal. Behavior normal. Thought content normal.         This note was generated with the assistance of ambient listening technology. Verbal consent was obtained by the patient and accompanying visitor(s) for the recording of patient appointment to facilitate this note. I attest to having reviewed and edited the generated note for accuracy, though some syntax or spelling errors may persist. Please contact the author of this note for any clarification.      TOTAL TIME evaluating and managing this patient for this encounter was greater than or equal to 10 minutes.  This time was exclusive of any separately billable procedures for this patient and exclusive of time spent treating any other patient.    Documentation entered by me for this encounter may have been done in part using speech-recognition technology. Although I have made an effort to ensure accuracy, "sound like" errors may exist and should be interpreted in context.    Each " patient to whom medical services are provided by telemedicine is: (1) informed of the relationship between the physician and patient and the respective role of any other health care provider with respect to management of the patient; and (2) notified that he or she may decline to receive medical services by telemedicine and may withdraw from such care at any time.

## 2024-02-29 NOTE — TELEPHONE ENCOUNTER
TO MY TEAM:   Schedule her for VV in late May.  Schedule OV in mid-late August.    She said to go ahead and schedule her any day after 8:00 AM and she would see appointments on [facility].  Thanks.

## 2024-02-29 NOTE — TELEPHONE ENCOUNTER
Pt apps awaiting override due to insurance by Mauricio. 05/30/2024 12:00 vv and 08/30/2024 8:40 am Annual

## 2024-05-30 ENCOUNTER — OFFICE VISIT (OUTPATIENT)
Dept: INTERNAL MEDICINE | Facility: CLINIC | Age: 34
End: 2024-05-30
Payer: MEDICAID

## 2024-05-30 DIAGNOSIS — F90.0 ATTENTION DEFICIT HYPERACTIVITY DISORDER (ADHD), PREDOMINANTLY INATTENTIVE TYPE: ICD-10-CM

## 2024-05-30 PROCEDURE — 1159F MED LIST DOCD IN RCRD: CPT | Mod: CPTII,95,, | Performed by: FAMILY MEDICINE

## 2024-05-30 PROCEDURE — 99213 OFFICE O/P EST LOW 20 MIN: CPT | Mod: 95,,, | Performed by: FAMILY MEDICINE

## 2024-05-30 PROCEDURE — 1160F RVW MEDS BY RX/DR IN RCRD: CPT | Mod: CPTII,95,, | Performed by: FAMILY MEDICINE

## 2024-05-30 RX ORDER — DEXTROAMPHETAMINE SACCHARATE, AMPHETAMINE ASPARTATE MONOHYDRATE, DEXTROAMPHETAMINE SULFATE AND AMPHETAMINE SULFATE 7.5; 7.5; 7.5; 7.5 MG/1; MG/1; MG/1; MG/1
30 CAPSULE, EXTENDED RELEASE ORAL EVERY MORNING
Qty: 30 CAPSULE | Refills: 0 | Status: SHIPPED | OUTPATIENT
Start: 2024-06-27

## 2024-05-30 RX ORDER — DEXTROAMPHETAMINE SACCHARATE, AMPHETAMINE ASPARTATE MONOHYDRATE, DEXTROAMPHETAMINE SULFATE AND AMPHETAMINE SULFATE 7.5; 7.5; 7.5; 7.5 MG/1; MG/1; MG/1; MG/1
30 CAPSULE, EXTENDED RELEASE ORAL EVERY MORNING
Qty: 30 CAPSULE | Refills: 0 | Status: SHIPPED | OUTPATIENT
Start: 2024-05-30

## 2024-05-30 RX ORDER — DEXTROAMPHETAMINE SACCHARATE, AMPHETAMINE ASPARTATE MONOHYDRATE, DEXTROAMPHETAMINE SULFATE AND AMPHETAMINE SULFATE 7.5; 7.5; 7.5; 7.5 MG/1; MG/1; MG/1; MG/1
30 CAPSULE, EXTENDED RELEASE ORAL EVERY MORNING
Qty: 30 CAPSULE | Refills: 0 | Status: SHIPPED | OUTPATIENT
Start: 2024-07-25

## 2024-05-30 NOTE — PROGRESS NOTES
TELEMEDICINE VIRTUAL VIDEO VISIT  5/30/24 12:00 PM CDT    Visit Type: Audiovisual    Patient's Location: Alfie represents that they are located within the state of [place].    History of Present Illness    Alfie presents today for medication refill.    Alfie reports being off long-acting Adderall for the past month due to supply chain issues. Despite the availability of short-acting Adderall, the patient expresses a preference to remain on the long-acting formulation and is willing to wait until it becomes available again. Alfie reports that Adderall-XR is providing satisfactory relief of attention deficit disorder symptoms and helping compensate for deficits and improve level of functioning. Alfie appears to be tolerating current treatment well, reporting no mood instability, tics, disordered sleep, rapid heart rate, palpitations or other apparent adverse effects. I have observed no behaviors to suggest inappropriate use of prescribed medications. [place] [employer]acy Controlled Prescription Drug Monitoring database was queried and showed no activity to suggest abuse, diversion, or other inappropriate use of prescription medications.       Review of Systems   Constitutional:  Negative for activity change and unexpected weight change.   HENT:  Negative for hearing loss, rhinorrhea and trouble swallowing.    Eyes:  Negative for discharge and visual disturbance.   Respiratory:  Negative for chest tightness and wheezing.    Cardiovascular:  Negative for chest pain and palpitations.   Gastrointestinal:  Negative for blood in stool, constipation, diarrhea and vomiting.   Endocrine: Negative for polydipsia and polyuria.   Genitourinary:  Negative for difficulty urinating, dysuria, hematuria and menstrual problem.   Musculoskeletal:  Negative for arthralgias, joint swelling and neck pain.   Neurological:  Negative for weakness and headaches.   Psychiatric/Behavioral:  Negative for confusion and  dysphoric mood.        Assessment & Plan     Continued the patient's current medication, Adderall XR 30 mg daily, for ADHD, predominantly inattentive type.   Considered a switch to short-acting Adderall due to supply chain issues, but respected the patient's preference for the long-acting version.   Prescribed Adderall XR 30 mg daily, instructing the patient to continue as previously directed.   Scheduled an in-office appointment for the patient's next visit.   Advised the patient to communicate directly for medication refills if their condition is stable, to expedite the process and reduce waiting times.       1. Attention deficit hyperactivity disorder (ADHD), predominantly inattentive type  -     dextroamphetamine-amphetamine (ADDERALL XR) 30 MG 24 hr capsule; Take 1 capsule (30 mg total) by mouth every morning.  Dispense: 30 capsule; Refill: 0  -     dextroamphetamine-amphetamine (ADDERALL XR) 30 MG 24 hr capsule; Take 1 capsule (30 mg total) by mouth every morning.  Dispense: 30 capsule; Refill: 0  -     dextroamphetamine-amphetamine (ADDERALL XR) 30 MG 24 hr capsule; Take 1 capsule (30 mg total) by mouth every morning.  Dispense: 30 capsule; Refill: 0    No other significant complaints or concerns were reported.    There were no vitals filed for this visit.  Physical Exam    Constitutional: No acute distress. Normal appearance. Not ill-appearing.  Pulmonary: Pulmonary effort is normal. No respiratory distress.  Skin: Skin is not jaundiced.  Neurological: Alert. Mental status is at baseline.  Psychiatric: Mood normal. Behavior normal. Thought content normal.         This note was generated with the assistance of ambient listening technology. Verbal consent was obtained by the patient and accompanying visitor(s) for the recording of patient appointment to facilitate this note. I attest to having reviewed and edited the generated note for accuracy, though some syntax or spelling errors may persist. Please contact  "the author of this note for any clarification.      TOTAL TIME evaluating and managing this patient for this encounter was greater than or equal to 11 minutes.  This time was exclusive of any separately billable procedures for this patient and exclusive of time spent treating any other patient.    Documentation entered by me for this encounter may have been done in part using speech-recognition technology. Although I have made an effort to ensure accuracy, "sound like" errors may exist and should be interpreted in context.    Each patient to whom medical services are provided by telemedicine is: (1) informed of the relationship between the physician and patient and the respective role of any other health care provider with respect to management of the patient; and (2) notified that he or she may decline to receive medical services by telemedicine and may withdraw from such care at any time.   "

## 2024-08-30 ENCOUNTER — PATIENT MESSAGE (OUTPATIENT)
Dept: INTERNAL MEDICINE | Facility: CLINIC | Age: 34
End: 2024-08-30

## 2024-08-30 ENCOUNTER — OFFICE VISIT (OUTPATIENT)
Dept: INTERNAL MEDICINE | Facility: CLINIC | Age: 34
End: 2024-08-30
Payer: MEDICAID

## 2024-08-30 VITALS
TEMPERATURE: 97 F | WEIGHT: 185.44 LBS | HEIGHT: 64 IN | HEART RATE: 94 BPM | OXYGEN SATURATION: 99 % | SYSTOLIC BLOOD PRESSURE: 118 MMHG | DIASTOLIC BLOOD PRESSURE: 82 MMHG | BODY MASS INDEX: 31.66 KG/M2

## 2024-08-30 DIAGNOSIS — Z01.419 PAP SMEAR, AS PART OF ROUTINE GYNECOLOGICAL EXAMINATION: ICD-10-CM

## 2024-08-30 DIAGNOSIS — F90.0 ATTENTION DEFICIT HYPERACTIVITY DISORDER (ADHD), PREDOMINANTLY INATTENTIVE TYPE: Primary | Chronic | ICD-10-CM

## 2024-08-30 DIAGNOSIS — Z00.00 PREVENTATIVE HEALTH CARE: ICD-10-CM

## 2024-08-30 DIAGNOSIS — E66.09 CLASS 1 OBESITY DUE TO EXCESS CALORIES WITHOUT SERIOUS COMORBIDITY WITH BODY MASS INDEX (BMI) OF 31.0 TO 31.9 IN ADULT: Chronic | ICD-10-CM

## 2024-08-30 DIAGNOSIS — Z23 NEEDS FLU SHOT: ICD-10-CM

## 2024-08-30 PROBLEM — E66.811 CLASS 1 OBESITY DUE TO EXCESS CALORIES WITHOUT SERIOUS COMORBIDITY WITH BODY MASS INDEX (BMI) OF 31.0 TO 31.9 IN ADULT: Chronic | Status: ACTIVE | Noted: 2022-08-08

## 2024-08-30 PROBLEM — E66.811 CLASS 1 OBESITY DUE TO EXCESS CALORIES WITHOUT SERIOUS COMORBIDITY WITH BODY MASS INDEX (BMI) OF 31.0 TO 31.9 IN ADULT: Status: ACTIVE | Noted: 2022-08-08

## 2024-08-30 PROCEDURE — 3074F SYST BP LT 130 MM HG: CPT | Mod: CPTII,,, | Performed by: FAMILY MEDICINE

## 2024-08-30 PROCEDURE — 90656 IIV3 VACC NO PRSV 0.5 ML IM: CPT | Mod: PBBFAC

## 2024-08-30 PROCEDURE — 1159F MED LIST DOCD IN RCRD: CPT | Mod: CPTII,,, | Performed by: FAMILY MEDICINE

## 2024-08-30 PROCEDURE — 90471 IMMUNIZATION ADMIN: CPT | Mod: PBBFAC

## 2024-08-30 PROCEDURE — 3008F BODY MASS INDEX DOCD: CPT | Mod: CPTII,,, | Performed by: FAMILY MEDICINE

## 2024-08-30 PROCEDURE — 99214 OFFICE O/P EST MOD 30 MIN: CPT | Mod: S$PBB,,, | Performed by: FAMILY MEDICINE

## 2024-08-30 PROCEDURE — 99214 OFFICE O/P EST MOD 30 MIN: CPT | Mod: PBBFAC | Performed by: FAMILY MEDICINE

## 2024-08-30 PROCEDURE — 99999PBSHW PR PBB SHADOW TECHNICAL ONLY FILED TO HB: Mod: PBBFAC,,,

## 2024-08-30 PROCEDURE — 3079F DIAST BP 80-89 MM HG: CPT | Mod: CPTII,,, | Performed by: FAMILY MEDICINE

## 2024-08-30 PROCEDURE — 99999 PR PBB SHADOW E&M-EST. PATIENT-LVL IV: CPT | Mod: PBBFAC,,, | Performed by: FAMILY MEDICINE

## 2024-08-30 PROCEDURE — G2211 COMPLEX E/M VISIT ADD ON: HCPCS | Mod: S$PBB,,, | Performed by: FAMILY MEDICINE

## 2024-08-30 PROCEDURE — 1160F RVW MEDS BY RX/DR IN RCRD: CPT | Mod: CPTII,,, | Performed by: FAMILY MEDICINE

## 2024-08-30 RX ORDER — DEXTROAMPHETAMINE SACCHARATE, AMPHETAMINE ASPARTATE, DEXTROAMPHETAMINE SULFATE AND AMPHETAMINE SULFATE 2.5; 2.5; 2.5; 2.5 MG/1; MG/1; MG/1; MG/1
10 TABLET ORAL 2 TIMES DAILY
Qty: 60 TABLET | Refills: 0 | Status: SHIPPED | OUTPATIENT
Start: 2024-08-30

## 2024-08-30 RX ADMIN — INFLUENZA VIRUS VACCINE 0.5 ML: 15; 15; 15 SUSPENSION INTRAMUSCULAR at 08:08

## 2024-08-30 NOTE — ASSESSMENT & PLAN NOTE
"Wt Readings from Last 6 Encounters:   08/30/24 84.1 kg (185 lb 6.5 oz)   08/30/23 74.5 kg (164 lb 3.9 oz)   08/08/22 76.8 kg (169 lb 5 oz)   06/04/21 80.4 kg (177 lb 4 oz)   03/04/21 81.5 kg (179 lb 10.8 oz)   02/20/20 79.3 kg (174 lb 13.2 oz)     Estimated body mass index is 31.83 kg/m² as calculated from the following:    Height as of this encounter: 5' 4" (1.626 m).    Weight as of this encounter: 84.1 kg (185 lb 6.5 oz).    "

## 2024-09-09 NOTE — PROGRESS NOTES
OFFICE VISIT 8/30/24  8:40 AM CDT    History of Present Illness    Alfie presents today for follow up.    She reports significant difficulty focusing, particularly during schoolwork, and struggles with task initiation without medication. Her ADHD symptoms primarily impact her during morning hours when most classes are scheduled. She finds Adderall effective when available. She was previously on Adderall XR 30mg but has had difficulty obtaining it from the pharmacy since February. Recently, she successfully used two 5mg Adderall immediate-release tablets, which proved effective. She agrees to try Adderall immediate-release tablets as an alternative due to supply issues with the extended-release formulation. She previously tried Vyvanse but did not tolerate it well.    She initially started Adderall tablets in third grade, later switching to the extended-release formulation when it became available. She has been on the extended-release version for an extended period since then.    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 In response, she plans to exercise with her daughter, considering boxing or [id number]. She expresses commitment to improving their health and eating habits.    She is overdue for a Pap test. Her previous appointment was rescheduled due to a conflict, and she has not followed up to reschedule.         Assessment & Plan     Reviewed patient's history of ADHD and previous medication regimens   Considered alternatives to Adderall XR 30mg due to supply chain issues   Opted for immediate-release Adderall tablets to address concentration needs, particularly in the morning   Initially planned for 20mg AM and 10mg at lunch, but adjusted to lower starting dose of 10mg AM and 5mg at lunch based on patient's recent experience with lower doses   Allowed for dose titration up to 10mg 2 times  daily if needed   Noted weight gain trend over the past year   Reviewed recent lab results, finding them within normal limits  Z68.1 BODY MASS INDEX [BMI] 19.9 OR LESS, ADULT:   Discussed the patient's weight gain trend over the last year.   Advised the patient to monitor weight using a scale at home, preferably one that measures body fat percentage.   Instructed the patient to weigh consistently: in the morning after urinating, either naked or wearing the same clothes each time.   Recommend identifying a maximum acceptable weight to prompt lifestyle changes if reached.   Encouraged the patient and daughter's plan to start exercising together.   Emphasized the importance of regular weight monitoring.  F90.0 ATTENTION-DEFICIT HYPERACTIVITY DISORDER, PREDOMINANTLY INATTENTIVE TYPE:   Reviewed medication history, including Adderall XR 30 and Vyvanse.   Discussed alternative medication options with the patient.   Assessed the patient's concentration needs throughout the day.   Prescribed Adderall 10mg tablets: 10mg in the morning and 5mg around lunchtime.   Instructed the patient to increase to 10mg twice daily if needed, not exceeding prescribed amount.   Explained the option to split Adderall tablets if needed for dose adjustment.   Advised monitoring for side effects such as palpitations and jitteriness, and adjusting dose if experienced.   Scheduled a follow-up virtual video visit in 3-4 weeks to assess medication efficacy and make necessary adjustments.   Planned to transition to a 90-day prescription once optimal dosage is established without side effects.  Z12.4 ENCOUNTER FOR SCREENING FOR MALIGNANT NEOPLASM OF CERVIX:   Acknowledged the patient's report of a long interval since last Pap test.   Placed a new referral order for Pap test.  LIFESTYLE CHANGES:   Advised on the relationship between exercise, muscle gain, and weight loss.   Recommend use of a body fat percentage measuring scale for more accurate health  "tracking.   Recommend engaging in physical activity with daughter, such as boxing or [id number].       1. Attention deficit hyperactivity disorder (ADHD), predominantly inattentive type  -     dextroamphetamine-amphetamine (ADDERALL) 10 mg Tab; Take 1 tablet (10 mg total) by mouth 2 (two) times a day. (In early morning and around noon)  Dispense: 60 tablet; Refill: 0    2. Class 1 obesity with body mass index (BMI) of 31.0 to 31.9  Assessment & Plan:  Wt Readings from Last 6 Encounters:   08/30/24 84.1 kg (185 lb 6.5 oz)   08/30/23 74.5 kg (164 lb 3.9 oz)   08/08/22 76.8 kg (169 lb 5 oz)   06/04/21 80.4 kg (177 lb 4 oz)   03/04/21 81.5 kg (179 lb 10.8 oz)   02/20/20 79.3 kg (174 lb 13.2 oz)     Estimated body mass index is 31.83 kg/m² as calculated from the following:    Height as of this encounter: 5' 4" (1.626 m).    Weight as of this encounter: 84.1 kg (185 lb 6.5 oz).        3. Preventative health care    4. Needs flu shot  -     influenza (Flulaval, Fluzone, Fluarix) 45 mcg/0.5 mL vaccine 0.5 mL    5. Pap smear, as part of routine gynecological examination  -     Ambulatory referral/consult to Gynecology; Future; Expected date: 09/06/2024    No other significant complaints or concerns were reported.  Today's visit involved the intricate management of episodic problem(s) and the ongoing care for the patient's serious or complex condition(s) listed above, reflecting the inherent complexity of providing longitudinal, comprehensive evaluation and management as the central hub for the patient's primary care services.  [place] Board of [employer]acy Controlled Prescription Drug Monitoring database was queried and showed no activity to suggest abuse, diversion, or other improper use of prescription medications.   Vitals:    08/30/24 0841   BP: 118/82   BP Location: Right arm   Patient Position: Sitting   BP Method: Large (Manual)   Pulse: 94   Temp: 96.5 °F (35.8 °C)   TempSrc: Tympanic   SpO2: 99%   Weight: 84.1 kg (185 " "lb 6.5 oz)   Height: 5' 4" (1.626 m)   Physical Exam  Vitals reviewed.   Constitutional:       General: She is not in acute distress.     Appearance: Normal appearance. She is not ill-appearing, toxic-appearing or diaphoretic.   HENT:      Head: Normocephalic and atraumatic.      Right Ear: Tympanic membrane, ear canal and external ear normal.      Left Ear: Tympanic membrane, ear canal and external ear normal.   Eyes:      General: No scleral icterus.     Conjunctiva/sclera: Conjunctivae normal.   Neck:      Vascular: No carotid bruit.   Cardiovascular:      Rate and Rhythm: Normal rate and regular rhythm.      Heart sounds: Normal heart sounds.   Pulmonary:      Effort: Pulmonary effort is normal.      Breath sounds: Normal breath sounds.   Abdominal:      General: Bowel sounds are normal. There is no distension.      Palpations: Abdomen is soft. There is no mass.      Tenderness: There is no abdominal tenderness.   Musculoskeletal:         General: No tenderness.      Cervical back: No muscular tenderness.   Lymphadenopathy:      Cervical: No cervical adenopathy.   Skin:     General: Skin is warm and dry.      Coloration: Skin is not jaundiced.   Neurological:      General: No focal deficit present.      Mental Status: She is alert and oriented to person, place, and time. Mental status is at baseline.      Cranial Nerves: No cranial nerve deficit.      Gait: Gait normal.   Psychiatric:         Mood and Affect: Mood normal.         Behavior: Behavior normal.         Judgment: Judgment normal.       Physical Exam    Lungs: Lungs are clear.  Cardiovascular: Heart is regular. Carotid arteries sound normal.  Neck: Thyroid palpation normal.  Abdomen: Normal bowel sounds.  Extremities: Legs normal on palpation.         This note was generated with the assistance of ambient listening technology. Verbal consent was obtained by the patient and accompanying visitor(s) for the recording of patient appointment to facilitate " "this note. I attest to having reviewed and edited the generated note for accuracy, though some syntax or spelling errors may persist. Please contact the author of this note for any clarification.    Documentation entered by me for this encounter may have been done in part using speech-recognition technology. Although I have made an effort to ensure accuracy, "sound like" errors may exist and should be interpreted in context.   "

## 2024-09-27 ENCOUNTER — OFFICE VISIT (OUTPATIENT)
Dept: INTERNAL MEDICINE | Facility: CLINIC | Age: 34
End: 2024-09-27
Payer: MEDICAID

## 2024-09-27 DIAGNOSIS — F90.0 ATTENTION DEFICIT HYPERACTIVITY DISORDER (ADHD), PREDOMINANTLY INATTENTIVE TYPE: Chronic | ICD-10-CM

## 2024-09-27 RX ORDER — DEXTROAMPHETAMINE SACCHARATE, AMPHETAMINE ASPARTATE, DEXTROAMPHETAMINE SULFATE AND AMPHETAMINE SULFATE 2.5; 2.5; 2.5; 2.5 MG/1; MG/1; MG/1; MG/1
10 TABLET ORAL 2 TIMES DAILY
Qty: 60 TABLET | Refills: 0 | Status: SHIPPED | OUTPATIENT
Start: 2024-09-27

## 2024-09-27 NOTE — PROGRESS NOTES
TELEMEDICINE VIRTUAL VIDEO VISIT  9/27/24 12:00 PM CDT    Visit Type: Audiovisual    Patient's Location: Alfie represents that they are located within the state of [place].    CHIEF COMPLAINT: Follow-up    Regrettably, she reports that her pharmacist did not have the Adderall 2-milligram tablets and was unable to obtain them. Her other pharmacies close to her house either did not have them or did not accept her insurance. It was agreed to send her Adderall prescription to her pharmacy, and I told her to let me know if she has any difficulty obtaining the medication or if she has any side effects. Otherwise, there have been no significant interval changes since her last appointment. She is to schedule a video visit before she needs an additional refill to reassess how she is doing.      1. Attention deficit hyperactivity disorder (ADHD), predominantly inattentive type  -     dextroamphetamine-amphetamine (ADDERALL) 10 mg Tab; Take 1 tablet (10 mg total) by mouth 2 (two) times a day (in early morning and around noon)  Dispense: 60 tablet; Refill: 0    No other significant complaints or concerns were reported.  Today's visit involved the intricate management of episodic problem(s) and the ongoing care for the patient's serious or complex condition(s) listed above, reflecting the inherent complexity of providing longitudinal, comprehensive evaluation and management as the central hub for the patient's primary care services.  [place] Board of Pharmacy Controlled Prescription Drug Monitoring database was queried and showed no activity to suggest abuse, diversion, or other improper use of prescription medications.   There were no vitals filed for this visit.  PHYSICAL EXAM:  GENERAL APPEARANCE:  - Alert and grossly oriented.  - No apparent distress, breathing comfortably.     EYES:  - Sclera without icterus.     EARS, NOSE, AND THROAT:  - No visible abnormalities.     RESPIRATORY:  - No respiratory distress.  - No  "audible wheezing or cough.     PSYCHIATRIC:  - Mood and affect appropriate; behavior cooperative.  Review of Systems   Constitutional:  Negative for activity change and unexpected weight change.   HENT:  Negative for hearing loss, rhinorrhea and trouble swallowing.    Eyes:  Negative for discharge and visual disturbance.   Respiratory:  Negative for chest tightness and wheezing.    Cardiovascular:  Negative for chest pain and palpitations.   Gastrointestinal:  Negative for blood in stool, constipation, diarrhea and vomiting.   Endocrine: Negative for polydipsia and polyuria.   Genitourinary:  Negative for difficulty urinating, dysuria, hematuria and menstrual problem.   Musculoskeletal:  Negative for arthralgias, joint swelling and neck pain.   Neurological:  Negative for weakness and headaches.   Psychiatric/Behavioral:  Negative for confusion and dysphoric mood.      I spent a total of  8  minutes today evaluating and managing this patient for this encounter.  This includes face to face time and non-face to face time preparing to see the patient (eg, review of tests), obtaining and/or reviewing separately obtained history, documenting clinical information in the electronic or other health record, independently interpreting results and communicating results to the patient/family/caregiver, or care coordinator.  This time was exclusive of any separately billable procedures for this patient and exclusive of time spent treating any other patient.    Documentation entered by me for this encounter may have been done in part using speech-recognition technology. Although I have made an effort to ensure accuracy, "sound like" errors may exist and should be interpreted in context.  Each patient to whom medical services are provided by telemedicine is: (1) informed of the relationship between the physician and patient and the respective role of any other health care provider with respect to management of the patient; and (2) " notified that he or she may decline to receive medical services by telemedicine and may withdraw from such care at any time.

## 2024-10-25 ENCOUNTER — OFFICE VISIT (OUTPATIENT)
Dept: INTERNAL MEDICINE | Facility: CLINIC | Age: 34
End: 2024-10-25
Payer: MEDICAID

## 2024-10-25 VITALS — WEIGHT: 180 LBS | BODY MASS INDEX: 30.73 KG/M2 | HEIGHT: 64 IN

## 2024-10-25 DIAGNOSIS — E66.09 CLASS 1 OBESITY DUE TO EXCESS CALORIES WITHOUT SERIOUS COMORBIDITY WITH BODY MASS INDEX (BMI) OF 30.0 TO 30.9 IN ADULT: Chronic | ICD-10-CM

## 2024-10-25 DIAGNOSIS — F90.0 ATTENTION DEFICIT HYPERACTIVITY DISORDER (ADHD), PREDOMINANTLY INATTENTIVE TYPE: Primary | Chronic | ICD-10-CM

## 2024-10-25 DIAGNOSIS — E66.811 CLASS 1 OBESITY DUE TO EXCESS CALORIES WITHOUT SERIOUS COMORBIDITY WITH BODY MASS INDEX (BMI) OF 30.0 TO 30.9 IN ADULT: Chronic | ICD-10-CM

## 2024-10-25 RX ORDER — DEXTROAMPHETAMINE SACCHARATE, AMPHETAMINE ASPARTATE, DEXTROAMPHETAMINE SULFATE AND AMPHETAMINE SULFATE 2.5; 2.5; 2.5; 2.5 MG/1; MG/1; MG/1; MG/1
TABLET ORAL
Qty: 90 TABLET | Refills: 0 | Status: SHIPPED | OUTPATIENT
Start: 2024-12-20

## 2024-10-25 RX ORDER — DEXTROAMPHETAMINE SACCHARATE, AMPHETAMINE ASPARTATE, DEXTROAMPHETAMINE SULFATE AND AMPHETAMINE SULFATE 2.5; 2.5; 2.5; 2.5 MG/1; MG/1; MG/1; MG/1
TABLET ORAL
Qty: 90 TABLET | Refills: 0 | Status: SHIPPED | OUTPATIENT
Start: 2024-11-22

## 2024-10-25 RX ORDER — DEXTROAMPHETAMINE SACCHARATE, AMPHETAMINE ASPARTATE, DEXTROAMPHETAMINE SULFATE AND AMPHETAMINE SULFATE 2.5; 2.5; 2.5; 2.5 MG/1; MG/1; MG/1; MG/1
TABLET ORAL
Qty: 90 TABLET | Refills: 0 | Status: SHIPPED | OUTPATIENT
Start: 2024-10-25

## 2024-10-25 NOTE — PROGRESS NOTES
TELEMEDICINE VIRTUAL VIDEO VISIT  10/25/24  2:40 PM CDT    Visit Type: Audiovisual    Patient's Location: Alfie represents that they are located within the state of [place].    CHIEF COMPLAINT: Follow-up    1. Attention deficit hyperactivity disorder (ADHD), predominantly inattentive type  Assessment & Plan:  She reports that the Adderall 10-milligram tablets, taking one in the morning and one in the afternoon, did not provide a satisfactory degree or duration of symptom relief for her attention deficit disorder. She took it upon herself to try a 20-milligram dose in the morning, and she says that this provided satisfactory symptom relief without any side effects but did not provide sufficient duration of therapeutic benefit. She found herself becoming more unfocused and inefficient later in the afternoon. She wants to try 20 milligrams in the morning and one-half to one tablet in the afternoon, which is a reasonable request. She reports no palpitations, tachycardia, mood instability, or disordered sleep.    Orders:  -     dextroamphetamine-amphetamine (ADDERALL) 10 mg Tab; Take two tablets (20 mg) by mouth each morning and one tablet (10 mg) in the afternoon for ADD  Dispense: 90 tablet; Refill: 0  -     dextroamphetamine-amphetamine (ADDERALL) 10 mg Tab; Take two tablets (20 mg) by mouth each morning and one tablet (10 mg) in the afternoon for ADD  Dispense: 90 tablet; Refill: 0  -     dextroamphetamine-amphetamine (ADDERALL) 10 mg Tab; Take two tablets (20 mg) by mouth each morning and one tablet (10 mg) in the afternoon for ADD  Dispense: 90 tablet; Refill: 0    2. Class 1 obesity due to excess calories without serious comorbidity with body mass index (BMI) of 30.0 to 30.9 in adult  Assessment & Plan:  Improved with healthier diet and increased physical activity.  Wt Readings from Last 6 Encounters:   10/25/24 81.6 kg (180 lb)   08/30/24 84.1 kg (185 lb 6.5 oz)   08/30/23 74.5 kg (164 lb 3.9 oz)   08/08/22  "76.8 kg (169 lb 5 oz)   06/04/21 80.4 kg (177 lb 4 oz)   03/04/21 81.5 kg (179 lb 10.8 oz)     Estimated body mass index is 30.9 kg/m² as calculated from the following:    Height as of this encounter: 5' 4" (1.626 m).    Weight as of this encounter: 81.6 kg (180 lb).        No other significant complaints or concerns were reported.  Today's visit involved the intricate management of episodic problem(s) and the ongoing care for the patient's serious or complex condition(s) listed above, reflecting the inherent complexity of providing longitudinal, comprehensive evaluation and management as the central hub for the patient's primary care services.  [place] Board of Pharmacy Controlled Prescription Drug Monitoring database was queried and showed no activity to suggest abuse, diversion, or other improper use of prescription medications.   Vitals:    10/25/24 1449   Weight: 81.6 kg (180 lb)   Height: 5' 4" (1.626 m)     PHYSICAL EXAM:  GENERAL APPEARANCE:  - Alert and grossly oriented.  - No apparent distress, breathing comfortably.     EYES:  - Sclera without icterus.     EARS, NOSE, AND THROAT:  - No visible abnormalities.     RESPIRATORY:  - No respiratory distress.  - No audible wheezing or cough.     PSYCHIATRIC:  - Mood and affect appropriate; behavior cooperative.  Review of Systems   Constitutional:  Negative for activity change and unexpected weight change.   HENT:  Negative for hearing loss, rhinorrhea and trouble swallowing.    Eyes:  Negative for discharge and visual disturbance.   Respiratory:  Negative for chest tightness and wheezing.    Cardiovascular:  Negative for chest pain and palpitations.   Gastrointestinal:  Negative for blood in stool, constipation, diarrhea and vomiting.   Endocrine: Negative for polydipsia and polyuria.   Genitourinary:  Negative for difficulty urinating, dysuria, hematuria and menstrual problem.   Musculoskeletal:  Negative for arthralgias, joint swelling and neck pain. " "  Neurological:  Negative for weakness and headaches.   Psychiatric/Behavioral:  Negative for confusion and dysphoric mood.      Follow up in about 12 weeks (around 1/17/2025) for virtual visit, w/ me.     I spent a total of 15 minutes today evaluating and managing this patient for this encounter.  This includes face to face time and non-face to face time preparing to see the patient (eg, review of tests), obtaining and/or reviewing separately obtained history, documenting clinical information in the electronic or other health record, independently interpreting results and communicating results to the patient/family/caregiver, or care coordinator.  This time was exclusive of any separately billable procedures for this patient and exclusive of time spent treating any other patient.    Documentation entered by me for this encounter may have been done in part using speech-recognition technology. Although I have made an effort to ensure accuracy, "sound like" errors may exist and should be interpreted in context.  Each patient to whom medical services are provided by telemedicine is: (1) informed of the relationship between the physician and patient and the respective role of any other health care provider with respect to management of the patient; and (2) notified that he or she may decline to receive medical services by telemedicine and may withdraw from such care at any time.  "

## 2024-10-25 NOTE — ASSESSMENT & PLAN NOTE
She reports that the Adderall 10-milligram tablets, taking one in the morning and one in the afternoon, did not provide a satisfactory degree or duration of symptom relief for her attention deficit disorder. She took it upon herself to try a 20-milligram dose in the morning, and she says that this provided satisfactory symptom relief without any side effects but did not provide sufficient duration of therapeutic benefit. She found herself becoming more unfocused and inefficient later in the afternoon. She wants to try 20 milligrams in the morning and one-half to one tablet in the afternoon, which is a reasonable request. She reports no palpitations, tachycardia, mood instability, or disordered sleep.

## 2024-10-25 NOTE — ASSESSMENT & PLAN NOTE
"Improved with healthier diet and increased physical activity.  Wt Readings from Last 6 Encounters:   10/25/24 81.6 kg (180 lb)   08/30/24 84.1 kg (185 lb 6.5 oz)   08/30/23 74.5 kg (164 lb 3.9 oz)   08/08/22 76.8 kg (169 lb 5 oz)   06/04/21 80.4 kg (177 lb 4 oz)   03/04/21 81.5 kg (179 lb 10.8 oz)     Estimated body mass index is 30.9 kg/m² as calculated from the following:    Height as of this encounter: 5' 4" (1.626 m).    Weight as of this encounter: 81.6 kg (180 lb).    "

## 2025-01-17 ENCOUNTER — OFFICE VISIT (OUTPATIENT)
Dept: INTERNAL MEDICINE | Facility: CLINIC | Age: 35
End: 2025-01-17
Payer: MEDICAID

## 2025-01-17 DIAGNOSIS — F90.0 ATTENTION DEFICIT HYPERACTIVITY DISORDER (ADHD), PREDOMINANTLY INATTENTIVE TYPE: Chronic | ICD-10-CM

## 2025-01-17 RX ORDER — DEXTROAMPHETAMINE SACCHARATE, AMPHETAMINE ASPARTATE, DEXTROAMPHETAMINE SULFATE AND AMPHETAMINE SULFATE 2.5; 2.5; 2.5; 2.5 MG/1; MG/1; MG/1; MG/1
TABLET ORAL
Qty: 90 TABLET | Refills: 0 | Status: SHIPPED | OUTPATIENT
Start: 2025-02-14

## 2025-01-17 RX ORDER — DEXTROAMPHETAMINE SACCHARATE, AMPHETAMINE ASPARTATE, DEXTROAMPHETAMINE SULFATE AND AMPHETAMINE SULFATE 2.5; 2.5; 2.5; 2.5 MG/1; MG/1; MG/1; MG/1
TABLET ORAL
Qty: 90 TABLET | Refills: 0 | Status: SHIPPED | OUTPATIENT
Start: 2025-01-17

## 2025-01-17 RX ORDER — DEXTROAMPHETAMINE SACCHARATE, AMPHETAMINE ASPARTATE, DEXTROAMPHETAMINE SULFATE AND AMPHETAMINE SULFATE 2.5; 2.5; 2.5; 2.5 MG/1; MG/1; MG/1; MG/1
TABLET ORAL
Qty: 90 TABLET | Refills: 0 | Status: SHIPPED | OUTPATIENT
Start: 2025-03-14

## 2025-01-17 NOTE — ASSESSMENT & PLAN NOTE
Alfie reports that her current treatment with Adderall is effectively managing her attention deficit disorder symptoms, helping to address deficits and improve her overall functioning. She appears to be tolerating the medication well, with no reports of mood instability, tics, sleep disturbances, rapid heart rate, palpitations, or other adverse effects. I have observed no behaviors indicative of inappropriate medication use. A query of the [place] [employer] Controlled Prescription Drug Monitoring database revealed no evidence of abuse, diversion, or other inappropriate use of prescription medications.

## 2025-01-17 NOTE — PROGRESS NOTES
TELEMEDICINE VIRTUAL VIDEO VISIT  1/17/25  1:40 PM CST    Visit Type: Audiovisual    Patient's Location: Alfie represents that they are located within the state of [place].    CHIEF COMPLAINT: Follow-up, Refills    1. Attention deficit hyperactivity disorder (ADHD), predominantly inattentive type  Assessment & Plan:  Alfie reports that her current treatment with Adderall is effectively managing her attention deficit disorder symptoms, helping to address deficits and improve her overall functioning. She appears to be tolerating the medication well, with no reports of mood instability, tics, sleep disturbances, rapid heart rate, palpitations, or other adverse effects. I have observed no behaviors indicative of inappropriate medication use. A query of the [place] [employer] Controlled Prescription Drug Monitoring database revealed no evidence of abuse, diversion, or other inappropriate use of prescription medications.    Orders:  -     dextroamphetamine-amphetamine (ADDERALL) 10 mg Tab; Take two tablets (20 mg) by mouth each morning and one tablet (10 mg) in the afternoon for ADD  Dispense: 90 tablet; Refill: 0  -     dextroamphetamine-amphetamine (ADDERALL) 10 mg Tab; Take two tablets (20 mg) by mouth each morning and one tablet (10 mg) in the afternoon for ADD  Dispense: 90 tablet; Refill: 0  -     dextroamphetamine-amphetamine (ADDERALL) 10 mg Tab; Take two tablets (20 mg) by mouth each morning and one tablet (10 mg) in the afternoon for ADD  Dispense: 90 tablet; Refill: 0    No other significant complaints or concerns were reported.  Today's visit involved the intricate management of episodic problem(s) and the ongoing care for the patient's serious or complex condition(s) listed above, reflecting the inherent complexity of providing longitudinal, comprehensive evaluation and management as the central hub for the patient's primary care services.  [place] [employer] Controlled Prescription  Drug Monitoring database was queried and showed no activity to suggest abuse, diversion, or other improper use of prescription medications.   There were no vitals filed for this visit.  PHYSICAL EXAM:  GENERAL APPEARANCE:  - Alert and grossly oriented.  - No apparent distress, breathing comfortably.     EYES:  - Sclera without icterus.     EARS, NOSE, AND THROAT:  - No visible abnormalities.     RESPIRATORY:  - No respiratory distress.  - No audible wheezing or cough.     PSYCHIATRIC:  - Mood and affect appropriate; behavior cooperative.  Review of Systems   Constitutional:  Positive for activity change. Negative for unexpected weight change.   HENT:  Negative for hearing loss, rhinorrhea and trouble swallowing.    Eyes:  Negative for discharge and visual disturbance.   Respiratory:  Negative for chest tightness and wheezing.    Cardiovascular:  Negative for chest pain and palpitations.   Gastrointestinal:  Negative for blood in stool, constipation, diarrhea and vomiting.   Endocrine: Negative for polydipsia and polyuria.   Genitourinary:  Negative for difficulty urinating, dysuria, hematuria and menstrual problem.   Musculoskeletal:  Negative for arthralgias, joint swelling and neck pain.   Neurological:  Negative for weakness and headaches.   Psychiatric/Behavioral:  Negative for confusion and dysphoric mood.      Follow up in about 12 weeks (around 4/11/2025) for virtual visit.     I spent a total of 10 minutes today evaluating and managing this patient for this encounter.  This includes face to face time and non-face to face time preparing to see the patient (eg, review of tests), obtaining and/or reviewing separately obtained history, documenting clinical information in the electronic or other health record, independently interpreting results and communicating results to the patient/family/caregiver, or care coordinator.  This time was exclusive of any separately billable procedures for this patient and  "exclusive of time spent treating any other patient.    Documentation entered by me for this encounter may have been done in part using speech-recognition technology. Although I have made an effort to ensure accuracy, "sound like" errors may exist and should be interpreted in context.  Each patient to whom medical services are provided by telemedicine is: (1) informed of the relationship between the physician and patient and the respective role of any other health care provider with respect to management of the patient; and (2) notified that he or she may decline to receive medical services by telemedicine and may withdraw from such care at any time.  "

## 2025-04-14 ENCOUNTER — PATIENT MESSAGE (OUTPATIENT)
Dept: INTERNAL MEDICINE | Facility: CLINIC | Age: 35
End: 2025-04-14

## 2025-04-14 ENCOUNTER — OFFICE VISIT (OUTPATIENT)
Dept: INTERNAL MEDICINE | Facility: CLINIC | Age: 35
End: 2025-04-14
Payer: MEDICAID

## 2025-04-14 ENCOUNTER — HOSPITAL ENCOUNTER (OUTPATIENT)
Dept: CARDIOLOGY | Facility: HOSPITAL | Age: 35
Discharge: HOME | End: 2025-04-14
Attending: FAMILY MEDICINE
Payer: MEDICAID

## 2025-04-14 VITALS
SYSTOLIC BLOOD PRESSURE: 118 MMHG | BODY MASS INDEX: 30.9 KG/M2 | RESPIRATION RATE: 18 BRPM | OXYGEN SATURATION: 98 % | TEMPERATURE: 98 F | HEIGHT: 64 IN | WEIGHT: 181 LBS | DIASTOLIC BLOOD PRESSURE: 72 MMHG | HEART RATE: 108 BPM

## 2025-04-14 DIAGNOSIS — E66.09 CLASS 1 OBESITY DUE TO EXCESS CALORIES WITHOUT SERIOUS COMORBIDITY WITH BODY MASS INDEX (BMI) OF 30.0 TO 30.9 IN ADULT: Chronic | ICD-10-CM

## 2025-04-14 DIAGNOSIS — E66.811 CLASS 1 OBESITY DUE TO EXCESS CALORIES WITHOUT SERIOUS COMORBIDITY WITH BODY MASS INDEX (BMI) OF 30.0 TO 30.9 IN ADULT: Chronic | ICD-10-CM

## 2025-04-14 DIAGNOSIS — Z13.6 ENCOUNTER FOR LIPID SCREENING FOR CARDIOVASCULAR DISEASE: ICD-10-CM

## 2025-04-14 DIAGNOSIS — Z01.419 PAP SMEAR, AS PART OF ROUTINE GYNECOLOGICAL EXAMINATION: ICD-10-CM

## 2025-04-14 DIAGNOSIS — Z13.220 ENCOUNTER FOR LIPID SCREENING FOR CARDIOVASCULAR DISEASE: ICD-10-CM

## 2025-04-14 DIAGNOSIS — Z13.1 SCREENING FOR DIABETES MELLITUS: ICD-10-CM

## 2025-04-14 DIAGNOSIS — F90.0 ATTENTION DEFICIT HYPERACTIVITY DISORDER (ADHD), PREDOMINANTLY INATTENTIVE TYPE: Primary | Chronic | ICD-10-CM

## 2025-04-14 DIAGNOSIS — Z79.899 ENCOUNTER FOR LONG-TERM CURRENT USE OF MEDICATION: ICD-10-CM

## 2025-04-14 DIAGNOSIS — Z11.4 SCREENING FOR HIV WITHOUT PRESENCE OF RISK FACTORS: ICD-10-CM

## 2025-04-14 DIAGNOSIS — Z11.3 ROUTINE SCREENING FOR STI (SEXUALLY TRANSMITTED INFECTION): ICD-10-CM

## 2025-04-14 LAB
OHS QRS DURATION: 78 MS
OHS QTC CALCULATION: 369 MS

## 2025-04-14 PROCEDURE — 84443 ASSAY THYROID STIM HORMONE: CPT | Performed by: FAMILY MEDICINE

## 2025-04-14 PROCEDURE — 87389 HIV-1 AG W/HIV-1&-2 AB AG IA: CPT | Performed by: FAMILY MEDICINE

## 2025-04-14 PROCEDURE — 1160F RVW MEDS BY RX/DR IN RCRD: CPT | Mod: CPTII,,, | Performed by: FAMILY MEDICINE

## 2025-04-14 PROCEDURE — 1159F MED LIST DOCD IN RCRD: CPT | Mod: CPTII,,, | Performed by: FAMILY MEDICINE

## 2025-04-14 PROCEDURE — G2211 COMPLEX E/M VISIT ADD ON: HCPCS | Mod: S$PBB,,, | Performed by: FAMILY MEDICINE

## 2025-04-14 PROCEDURE — 80061 LIPID PANEL: CPT | Performed by: FAMILY MEDICINE

## 2025-04-14 PROCEDURE — 87491 CHLMYD TRACH DNA AMP PROBE: CPT | Performed by: FAMILY MEDICINE

## 2025-04-14 PROCEDURE — 99999 PR PBB SHADOW E&M-EST. PATIENT-LVL IV: CPT | Mod: PBBFAC,,, | Performed by: FAMILY MEDICINE

## 2025-04-14 PROCEDURE — 3044F HG A1C LEVEL LT 7.0%: CPT | Mod: CPTII,,, | Performed by: FAMILY MEDICINE

## 2025-04-14 PROCEDURE — 80053 COMPREHEN METABOLIC PANEL: CPT | Performed by: FAMILY MEDICINE

## 2025-04-14 PROCEDURE — 83036 HEMOGLOBIN GLYCOSYLATED A1C: CPT | Performed by: FAMILY MEDICINE

## 2025-04-14 PROCEDURE — 93005 ELECTROCARDIOGRAM TRACING: CPT

## 2025-04-14 PROCEDURE — 99214 OFFICE O/P EST MOD 30 MIN: CPT | Mod: PBBFAC,25 | Performed by: FAMILY MEDICINE

## 2025-04-14 PROCEDURE — 3078F DIAST BP <80 MM HG: CPT | Mod: CPTII,,, | Performed by: FAMILY MEDICINE

## 2025-04-14 PROCEDURE — 3074F SYST BP LT 130 MM HG: CPT | Mod: CPTII,,, | Performed by: FAMILY MEDICINE

## 2025-04-14 PROCEDURE — 36415 COLL VENOUS BLD VENIPUNCTURE: CPT | Performed by: FAMILY MEDICINE

## 2025-04-14 PROCEDURE — 86593 SYPHILIS TEST NON-TREP QUANT: CPT | Performed by: FAMILY MEDICINE

## 2025-04-14 PROCEDURE — 3008F BODY MASS INDEX DOCD: CPT | Mod: CPTII,,, | Performed by: FAMILY MEDICINE

## 2025-04-14 PROCEDURE — 99214 OFFICE O/P EST MOD 30 MIN: CPT | Mod: S$PBB,,, | Performed by: FAMILY MEDICINE

## 2025-04-14 RX ORDER — DEXTROAMPHETAMINE SACCHARATE, AMPHETAMINE ASPARTATE, DEXTROAMPHETAMINE SULFATE AND AMPHETAMINE SULFATE 2.5; 2.5; 2.5; 2.5 MG/1; MG/1; MG/1; MG/1
TABLET ORAL
Qty: 90 TABLET | Refills: 0 | Status: SHIPPED | OUTPATIENT
Start: 2025-05-12

## 2025-04-14 RX ORDER — DEXTROAMPHETAMINE SACCHARATE, AMPHETAMINE ASPARTATE, DEXTROAMPHETAMINE SULFATE AND AMPHETAMINE SULFATE 2.5; 2.5; 2.5; 2.5 MG/1; MG/1; MG/1; MG/1
TABLET ORAL
Qty: 90 TABLET | Refills: 0 | Status: SHIPPED | OUTPATIENT
Start: 2025-06-09

## 2025-04-14 RX ORDER — DEXTROAMPHETAMINE SACCHARATE, AMPHETAMINE ASPARTATE, DEXTROAMPHETAMINE SULFATE AND AMPHETAMINE SULFATE 2.5; 2.5; 2.5; 2.5 MG/1; MG/1; MG/1; MG/1
TABLET ORAL
Qty: 90 TABLET | Refills: 0 | Status: SHIPPED | OUTPATIENT
Start: 2025-04-14

## 2025-04-14 NOTE — PROGRESS NOTES
"Health Maintenance Due   Topic Date Due    Cervical Cancer Screening  05/15/2022    COVID-19 Vaccine (1 - 2024-25 season) Never done    Hemoglobin A1c (Diabetic Prevention Screening)  Never done     Wt Readings from Last 6 Encounters:   04/14/25 82.1 kg (181 lb)   10/25/24 81.6 kg (180 lb)   08/30/24 84.1 kg (185 lb 6.5 oz)   08/30/23 74.5 kg (164 lb 3.9 oz)   08/08/22 76.8 kg (169 lb 5 oz)   06/04/21 80.4 kg (177 lb 4 oz)     Estimated body mass index is 31.07 kg/m² as calculated from the following:    Height as of this encounter: 5' 4" (1.626 m).    Weight as of this encounter: 82.1 kg (181 lb).    ***  OFFICE VISIT 4/14/25  9:20 AM CDT    CHIEF COMPLAINT: Follow-up    HPI  {There are no diagnoses linked to this encounter. (Refresh or delete this SmartLink)}     {Complex Visit?:61540::"Unless specified otherwise, chronic conditions are represented as and appear to be compensated/controlled and stable.","Today's visit involved the intricate management of episodic problem(s) and the ongoing care for the patient's serious or complex condition(s) listed above, reflecting the inherent complexity of providing longitudinal, comprehensive evaluation and management as the central hub for the patient's primary care services.","Any education and instructions provided to the patient via Patient Portal Message today are incorporated herein by reference."}    {LM ROS Optional (Optional):08397::"Except as noted herein, ROS is otherwise negative."}    Vitals:    04/14/25 0928   BP: 118/72   BP Location: Left arm   Patient Position: Sitting   Pulse: 108   Resp: 18   Temp: 98.3 °F (36.8 °C)   TempSrc: Tympanic   SpO2: 98%   Weight: 82.1 kg (181 lb)   Height: 5' 4" (1.626 m)   Physical Exam***  Documentation entered by me for this encounter may have been done in part using speech-recognition technology. Although I have made an effort to ensure accuracy, "sound like" errors may exist and should be interpreted in context.    {LM " Wrap-Up Instructions (Optional):51557}    No follow-ups on file.***No future appointments.   "98%   Weight: 82.1 kg (181 lb)   Height: 5' 4" (1.626 m)   Physical Exam  Vitals reviewed.   Constitutional:       General: She is not in acute distress.     Appearance: Normal appearance. She is not ill-appearing, toxic-appearing or diaphoretic.   HENT:      Head: Normocephalic and atraumatic.   Eyes:      General: No scleral icterus.     Conjunctiva/sclera: Conjunctivae normal.   Neck:      Thyroid: No thyroid mass, thyromegaly or thyroid tenderness.      Vascular: No carotid bruit.   Cardiovascular:      Rate and Rhythm: Normal rate and regular rhythm.   Pulmonary:      Effort: Pulmonary effort is normal.      Breath sounds: Normal breath sounds.   Skin:     General: Skin is warm and dry.   Neurological:      General: No focal deficit present.      Mental Status: She is alert and oriented to person, place, and time.   Psychiatric:         Mood and Affect: Mood normal.         Behavior: Behavior normal.         Thought Content: Thought content normal.         Judgment: Judgment normal.       Documentation entered by me for this encounter may have been done in part using speech-recognition technology. Although I have made an effort to ensure accuracy, "sound like" errors may exist and should be interpreted in context.    Follow up in about 3 months (around 7/14/2025) for virtual visit, w/ me.  Future Appointments   Date Time Provider Department Center   7/11/2025 10:10 AM LABORATORY, [facility]   7/14/2025  8:00 AM YUMIKO Estrada MD [facility]     "

## 2025-04-15 LAB
ALBUMIN SERPL BCP-MCNC: 4 G/DL (ref 3.5–5.2)
ALP SERPL-CCNC: 57 UNIT/L (ref 40–150)
ALT SERPL W/O P-5'-P-CCNC: 25 UNIT/L (ref 10–44)
ANION GAP (OHS): 12 MMOL/L (ref 8–16)
AST SERPL-CCNC: 22 UNIT/L (ref 11–45)
BILIRUB SERPL-MCNC: 0.5 MG/DL (ref 0.1–1)
BUN SERPL-MCNC: 10 MG/DL (ref 6–20)
CALCIUM SERPL-MCNC: 9.1 MG/DL (ref 8.7–10.5)
CHLORIDE SERPL-SCNC: 106 MMOL/L (ref 95–110)
CHOLEST SERPL-MCNC: 125 MG/DL (ref 120–199)
CHOLEST/HDLC SERPL: 2.5 {RATIO} (ref 2–5)
CO2 SERPL-SCNC: 19 MMOL/L (ref 23–29)
CREAT SERPL-MCNC: 0.8 MG/DL (ref 0.5–1.4)
EAG (OHS): 94 MG/DL (ref 68–131)
GFR SERPLBLD CREATININE-BSD FMLA CKD-EPI: >60 ML/MIN/1.73/M2
GLUCOSE SERPL-MCNC: 65 MG/DL (ref 70–110)
HBA1C MFR BLD: 4.9 % (ref 4–5.6)
HDLC SERPL-MCNC: 51 MG/DL (ref 40–75)
HDLC SERPL: 40.8 % (ref 20–50)
HIV 1+2 AB+HIV1 P24 AG SERPL QL IA: NORMAL
LDLC SERPL CALC-MCNC: 57.8 MG/DL (ref 63–159)
NONHDLC SERPL-MCNC: 74 MG/DL
POTASSIUM SERPL-SCNC: 3.2 MMOL/L (ref 3.5–5.1)
PROT SERPL-MCNC: 7.6 GM/DL (ref 6–8.4)
SODIUM SERPL-SCNC: 137 MMOL/L (ref 136–145)
T PALLIDUM IGG+IGM SER QL: NORMAL
TRIGL SERPL-MCNC: 81 MG/DL (ref 30–150)
TSH SERPL-ACNC: 1.58 UIU/ML (ref 0.4–4)

## 2025-04-18 ENCOUNTER — RESULTS FOLLOW-UP (OUTPATIENT)
Dept: INTERNAL MEDICINE | Facility: CLINIC | Age: 35
End: 2025-04-18
Payer: MEDICAID

## 2025-04-18 DIAGNOSIS — E87.6 LOW SERUM POTASSIUM: Primary | ICD-10-CM

## 2025-04-18 LAB
C TRACH DNA SPEC QL NAA+PROBE: NOT DETECTED
CTGC SOURCE (OHS) ORD-325: NORMAL
N GONORRHOEA DNA UR QL NAA+PROBE: NOT DETECTED

## 2025-04-19 NOTE — PROGRESS NOTES
TO MY TEAM:  Please contact Alfie to relay message.  Schedule potassium lab a few days before her appointment with me on July 14.   Orders Placed This Encounter    Potassium   --------------------------------------------------------------------------------   Dear Alfie,    I wanted to let you know that your recent labs mostly looked good. The only thing that stood out was that your potassium level is mildly low. Potassium is an important mineral that helps your muscles, heart, and nerves work properly, so we want to make sure it stays within a healthy range.    Its not entirely clear why your potassium is a little low. For now, I recommend following a diet that is rich in potassium. Foods high in potassium include white beans, potatoes and sweet potatoes, beets, spinach, tomato sauce, oranges and orange juice, bananas, avocados, yogurt, and salmon.    My staff will be reaching out to schedule a repeat potassium lab a few days before your next appointment with me. This will help us see if your levels have improved and guide any further steps if needed.    Best regards,    Dr. SHUKLA

## 2025-04-29 NOTE — ASSESSMENT & PLAN NOTE
"Estimated body mass index is 31.07 kg/m² as calculated from the following:    Height as of this encounter: 5' 4" (1.626 m).    Weight as of this encounter: 82.1 kg (181 lb).  Wt Readings from Last 6 Encounters:   04/14/25 82.1 kg (181 lb)   10/25/24 81.6 kg (180 lb)   08/30/24 84.1 kg (185 lb 6.5 oz)   08/30/23 74.5 kg (164 lb 3.9 oz)   08/08/22 76.8 kg (169 lb 5 oz)   06/04/21 80.4 kg (177 lb 4 oz)     PLAN: Pursue a daily calorie deficit of 500 to 750 kcal through a nutrient-dense diet such as the Mediterranean or DASH diet. Physical activity goal of at least 150 minutes per week of moderate aerobic exercise, along with resistance training two to three times weekly. Behavioral strategies include patients benefit from self-monitoring, goal setting, and participation in structured weight loss programs when available. Plan regular follow-up to maintain motivation and allows for treatment plan adjustments as needed.   "

## 2025-04-29 NOTE — ASSESSMENT & PLAN NOTE
Alfie reports that her current Adderall therapy is successfully managing her attention deficit disorder symptoms, reducing deficits and enhancing her overall functioning. She tolerates the medication well, with no mood instability, tics, sleep disturbances, tachycardia, palpitations, or other adverse effects. I have not observed any behaviors suggestive of inappropriate medication use. A query of the [place] [employer] Controlled Prescription Drug Monitoring database revealed no evidence of abuse, diversion, or other misuse of her prescription medications.

## 2025-07-11 ENCOUNTER — LAB VISIT (OUTPATIENT)
Dept: LAB | Facility: HOSPITAL | Age: 35
End: 2025-07-11
Attending: FAMILY MEDICINE
Payer: MEDICAID

## 2025-07-11 DIAGNOSIS — E87.6 LOW SERUM POTASSIUM: ICD-10-CM

## 2025-07-11 LAB — POTASSIUM SERPL-SCNC: 3.9 MMOL/L (ref 3.5–5.1)

## 2025-07-11 PROCEDURE — 84132 ASSAY OF SERUM POTASSIUM: CPT

## 2025-07-11 PROCEDURE — 36415 COLL VENOUS BLD VENIPUNCTURE: CPT

## 2025-07-14 ENCOUNTER — OFFICE VISIT (OUTPATIENT)
Dept: INTERNAL MEDICINE | Facility: CLINIC | Age: 35
End: 2025-07-14
Payer: MEDICAID

## 2025-07-14 DIAGNOSIS — F90.0 ATTENTION DEFICIT HYPERACTIVITY DISORDER (ADHD), PREDOMINANTLY INATTENTIVE TYPE: Chronic | ICD-10-CM

## 2025-07-14 PROCEDURE — 3044F HG A1C LEVEL LT 7.0%: CPT | Mod: CPTII,95,, | Performed by: FAMILY MEDICINE

## 2025-07-14 PROCEDURE — 1159F MED LIST DOCD IN RCRD: CPT | Mod: CPTII,95,, | Performed by: FAMILY MEDICINE

## 2025-07-14 PROCEDURE — G2211 COMPLEX E/M VISIT ADD ON: HCPCS | Mod: 95,,, | Performed by: FAMILY MEDICINE

## 2025-07-14 PROCEDURE — 1160F RVW MEDS BY RX/DR IN RCRD: CPT | Mod: CPTII,95,, | Performed by: FAMILY MEDICINE

## 2025-07-14 PROCEDURE — 98005 SYNCH AUDIO-VIDEO EST LOW 20: CPT | Mod: 95,,, | Performed by: FAMILY MEDICINE

## 2025-07-14 RX ORDER — DEXTROAMPHETAMINE SACCHARATE, AMPHETAMINE ASPARTATE, DEXTROAMPHETAMINE SULFATE AND AMPHETAMINE SULFATE 2.5; 2.5; 2.5; 2.5 MG/1; MG/1; MG/1; MG/1
TABLET ORAL
Qty: 90 TABLET | Refills: 0 | Status: SHIPPED | OUTPATIENT
Start: 2025-09-08

## 2025-07-14 RX ORDER — DEXTROAMPHETAMINE SACCHARATE, AMPHETAMINE ASPARTATE, DEXTROAMPHETAMINE SULFATE AND AMPHETAMINE SULFATE 2.5; 2.5; 2.5; 2.5 MG/1; MG/1; MG/1; MG/1
TABLET ORAL
Qty: 90 TABLET | Refills: 0 | Status: SHIPPED | OUTPATIENT
Start: 2025-07-14

## 2025-07-14 RX ORDER — DEXTROAMPHETAMINE SACCHARATE, AMPHETAMINE ASPARTATE, DEXTROAMPHETAMINE SULFATE AND AMPHETAMINE SULFATE 2.5; 2.5; 2.5; 2.5 MG/1; MG/1; MG/1; MG/1
TABLET ORAL
Qty: 90 TABLET | Refills: 0 | Status: SHIPPED | OUTPATIENT
Start: 2025-08-11

## 2025-07-14 NOTE — ASSESSMENT & PLAN NOTE
She reports that her current treatment with Adderall is providing satisfactory relief of her attention deficit disorder symptoms, helping her manage deficits and improve her overall functioning. She seems to be tolerating the medication well, denying any mood instability, tics, sleep disturbances, rapid heart rate, palpitations, or other notable side effects. I have not observed any behaviors that would indicate inappropriate use of her prescribed medications. A review of the [place] [employer] Controlled Prescription Drug Monitoring database revealed no evidence of abuse, diversion, or other inappropriate use of prescription medications.

## 2025-07-14 NOTE — PROGRESS NOTES
TELEMEDICINE VIRTUAL VIDEO VISIT  7/14/25  8:00 AM CDT    Visit Type: Audiovisual    Patient's Location: Alfie represents that they are located within the state [facility].    CHIEF COMPLAINT: Refills    HPI  1. Attention deficit hyperactivity disorder (ADHD), predominantly inattentive type  Assessment & Plan:  She reports that her current treatment with Adderall is providing satisfactory relief of her attention deficit disorder symptoms, helping her manage deficits and improve her overall functioning. She seems to be tolerating the medication well, denying any mood instability, tics, sleep disturbances, rapid heart rate, palpitations, or other notable side effects. I have not observed any behaviors that would indicate inappropriate use of her prescribed medications. A review of the [place] [employer] Controlled Prescription Drug Monitoring database revealed no evidence of abuse, diversion, or other inappropriate use of prescription medications.    Orders:  -     dextroamphetamine-amphetamine (ADDERALL) 10 mg Tab; Take two tablets (20 mg) by mouth each morning and one tablet (10 mg) in the afternoon for ADD  Dispense: 90 tablet; Refill: 0  -     dextroamphetamine-amphetamine (ADDERALL) 10 mg Tab; Take two tablets (20 mg) by mouth each morning and one tablet (10 mg) in the afternoon for ADD  Dispense: 90 tablet; Refill: 0  -     dextroamphetamine-amphetamine (ADDERALL) 10 mg Tab; Take two tablets (20 mg) by mouth each morning and one tablet (10 mg) in the afternoon for ADD  Dispense: 90 tablet; Refill: 0       Unless specified otherwise, chronic conditions are represented as and appear to be compensated/controlled and stable.  Today's visit involved the intricate management of episodic problem(s) and the ongoing care for the patient's serious or complex condition(s) listed above, reflecting the inherent complexity of providing longitudinal, comprehensive evaluation and management as the central hub for  the patient's primary care services.  [place] [employer] Controlled Prescription Drug Monitoring data reviewed.  There were no vitals filed for this visit.  PHYSICAL EXAM:  GENERAL APPEARANCE:  - Alert and grossly oriented.  - No apparent distress, breathing comfortably.     EYES:  - Sclera without icterus.     EARS, NOSE, AND THROAT:  - No visible abnormalities.     RESPIRATORY:  - No respiratory distress.  - No audible wheezing or cough.     PSYCHIATRIC:  - Mood and affect appropriate; behavior cooperative.  Review of Systems   Constitutional:  Negative for unexpected weight change.   HENT:  Negative for hearing loss, rhinorrhea and trouble swallowing.    Eyes:  Negative for discharge and visual disturbance.   Respiratory:  Negative for chest tightness and wheezing.    Cardiovascular:  Negative for chest pain and palpitations.   Gastrointestinal:  Negative for blood in stool, constipation, diarrhea and vomiting.   Endocrine: Negative for polydipsia and polyuria.   Genitourinary:  Negative for difficulty urinating, dysuria, hematuria and menstrual problem.   Musculoskeletal:  Negative for arthralgias, joint swelling and neck pain.   Neurological:  Negative for weakness and headaches.   Psychiatric/Behavioral:  Negative for confusion and dysphoric mood.      I spent a total of 10 minutes today evaluating and managing this patient for this encounter.  This includes face to face time and non-face to face time preparing to see the patient (eg, review of tests), obtaining and/or reviewing separately obtained history, documenting clinical information in the electronic or other health record, independently interpreting results and communicating results to the patient/family/caregiver, or care coordinator.  This time was exclusive of any separately billable procedures for this patient and exclusive of time spent treating any other patient.    Documentation entered by me for this encounter may have been done in  part using ambient-listening and speech-recognition technologies. I have reviewed the content for accuracy, though errors in syntax, spelling, or similar-sounding words may be present and should be interpreted in context. Please contact the author for any clarification.     Each patient to whom medical services are provided by telemedicine is: (1) informed of the relationship between the physician and patient and the respective role of any other health care provider with respect to management of the patient; and (2) notified that he or she may decline to receive medical services by telemedicine and may withdraw from such care at any time.    Follow up in about 12 weeks (around 10/6/2025) for virtual visit.

## 2025-08-11 DIAGNOSIS — F90.0 ATTENTION DEFICIT HYPERACTIVITY DISORDER (ADHD), PREDOMINANTLY INATTENTIVE TYPE: Chronic | ICD-10-CM

## 2025-08-12 ENCOUNTER — PATIENT MESSAGE (OUTPATIENT)
Dept: INTERNAL MEDICINE | Facility: CLINIC | Age: 35
End: 2025-08-12
Payer: MEDICAID

## 2025-08-12 RX ORDER — DEXTROAMPHETAMINE SACCHARATE, AMPHETAMINE ASPARTATE, DEXTROAMPHETAMINE SULFATE AND AMPHETAMINE SULFATE 2.5; 2.5; 2.5; 2.5 MG/1; MG/1; MG/1; MG/1
TABLET ORAL
Qty: 90 TABLET | Refills: 0 | OUTPATIENT
Start: 2025-08-12